# Patient Record
Sex: FEMALE | Race: WHITE | NOT HISPANIC OR LATINO | Employment: FULL TIME | URBAN - METROPOLITAN AREA
[De-identification: names, ages, dates, MRNs, and addresses within clinical notes are randomized per-mention and may not be internally consistent; named-entity substitution may affect disease eponyms.]

---

## 2017-06-01 ENCOUNTER — TRANSCRIBE ORDERS (OUTPATIENT)
Dept: ADMINISTRATIVE | Facility: HOSPITAL | Age: 52
End: 2017-06-01

## 2017-06-01 DIAGNOSIS — N63.20 LEFT BREAST MASS: Primary | ICD-10-CM

## 2017-06-08 ENCOUNTER — HOSPITAL ENCOUNTER (OUTPATIENT)
Dept: ULTRASOUND IMAGING | Facility: HOSPITAL | Age: 52
Discharge: HOME | End: 2017-06-08

## 2017-06-08 ENCOUNTER — HOSPITAL ENCOUNTER (OUTPATIENT)
Dept: MAMMOGRAPHY | Facility: HOSPITAL | Age: 52
Discharge: HOME | End: 2017-06-08
Admitting: NURSE PRACTITIONER

## 2017-06-08 DIAGNOSIS — N63.20 LEFT BREAST MASS: ICD-10-CM

## 2017-06-08 PROCEDURE — 76641 ULTRASOUND BREAST COMPLETE: CPT

## 2017-06-08 PROCEDURE — G0279 TOMOSYNTHESIS, MAMMO: HCPCS

## 2017-06-08 PROCEDURE — G0204 DX MAMMO INCL CAD BI: HCPCS

## 2020-02-10 ENCOUNTER — TRANSCRIBE ORDERS (OUTPATIENT)
Dept: ADMINISTRATIVE | Facility: HOSPITAL | Age: 55
End: 2020-02-10

## 2020-02-10 DIAGNOSIS — Z12.31 SCREENING MAMMOGRAM, ENCOUNTER FOR: Primary | ICD-10-CM

## 2020-02-26 ENCOUNTER — APPOINTMENT (OUTPATIENT)
Dept: MAMMOGRAPHY | Facility: HOSPITAL | Age: 55
End: 2020-02-26

## 2020-02-26 ENCOUNTER — HOSPITAL ENCOUNTER (OUTPATIENT)
Dept: MAMMOGRAPHY | Facility: HOSPITAL | Age: 55
Discharge: HOME | End: 2020-02-26
Admitting: NURSE PRACTITIONER

## 2020-02-26 DIAGNOSIS — Z12.31 SCREENING MAMMOGRAM, ENCOUNTER FOR: ICD-10-CM

## 2020-02-26 PROCEDURE — 77067 SCR MAMMO BI INCL CAD: CPT

## 2020-02-26 PROCEDURE — 77063 BREAST TOMOSYNTHESIS BI: CPT

## 2021-07-13 ENCOUNTER — TRANSCRIBE ORDERS (OUTPATIENT)
Dept: ADMINISTRATIVE | Facility: HOSPITAL | Age: 56
End: 2021-07-13

## 2021-07-13 DIAGNOSIS — Z92.89 HISTORY OF MAMMOGRAPHY, SCREENING: Primary | ICD-10-CM

## 2021-08-04 ENCOUNTER — HOSPITAL ENCOUNTER (OUTPATIENT)
Dept: MAMMOGRAPHY | Facility: HOSPITAL | Age: 56
End: 2021-08-04

## 2021-09-13 ENCOUNTER — HOSPITAL ENCOUNTER (OUTPATIENT)
Dept: MAMMOGRAPHY | Facility: HOSPITAL | Age: 56
Discharge: HOME | End: 2021-09-13
Admitting: NURSE PRACTITIONER

## 2021-09-13 DIAGNOSIS — Z92.89 HISTORY OF MAMMOGRAPHY, SCREENING: ICD-10-CM

## 2021-09-13 PROCEDURE — 77063 BREAST TOMOSYNTHESIS BI: CPT

## 2021-09-13 PROCEDURE — 77067 SCR MAMMO BI INCL CAD: CPT

## 2022-01-01 ENCOUNTER — HOSPITAL ENCOUNTER (EMERGENCY)
Facility: HOSPITAL | Age: 57
Discharge: HOME | End: 2022-01-01
Attending: EMERGENCY MEDICINE | Admitting: EMERGENCY MEDICINE

## 2022-01-01 VITALS
HEIGHT: 63 IN | SYSTOLIC BLOOD PRESSURE: 146 MMHG | RESPIRATION RATE: 18 BRPM | HEART RATE: 76 BPM | TEMPERATURE: 99 F | BODY MASS INDEX: 34.55 KG/M2 | OXYGEN SATURATION: 98 % | WEIGHT: 195 LBS | DIASTOLIC BLOOD PRESSURE: 96 MMHG

## 2022-01-01 DIAGNOSIS — B02.9 HERPES ZOSTER WITHOUT COMPLICATION: Primary | ICD-10-CM

## 2022-01-01 PROCEDURE — 99283 EMERGENCY DEPT VISIT LOW MDM: CPT

## 2022-01-01 PROCEDURE — 99282 EMERGENCY DEPT VISIT SF MDM: CPT

## 2022-01-01 RX ORDER — NAPROXEN 500 MG/1
500 TABLET ORAL 2 TIMES DAILY PRN
Qty: 10 TABLET | Refills: 0 | Status: SHIPPED | OUTPATIENT
Start: 2022-01-01 | End: 2022-01-06

## 2022-01-01 RX ORDER — ACYCLOVIR 200 MG/1
200 CAPSULE ORAL ONCE
Status: COMPLETED | OUTPATIENT
Start: 2022-01-01 | End: 2022-01-01

## 2022-01-01 RX ORDER — LOSARTAN POTASSIUM 25 MG/1
25 TABLET ORAL DAILY
COMMUNITY
End: 2022-07-27

## 2022-01-01 RX ORDER — ACYCLOVIR 400 MG/1
400 TABLET ORAL
Qty: 50 TABLET | Refills: 0 | Status: SHIPPED | OUTPATIENT
Start: 2022-01-01 | End: 2022-01-11

## 2022-01-01 RX ADMIN — ACYCLOVIR 200 MG: 200 CAPSULE ORAL at 13:32

## 2022-01-01 NOTE — ED PROVIDER NOTES
" EMERGENCY DEPARTMENT ENCOUNTER      Room Number: 06/06    History is provided by the patient, no translation services needed    HPI:    Chief complaint: Rash    Location: Right foot    Quality/Severity: Patient describes the pain as a burning pain.  She rates the pain a 7 out of 10.    Timing/Duration: 1 day    Modifying Factors: Touching the rash makes the pain worse.    Associated Symptoms: See narrative    Narrative: Pt is a 56 y.o. female who presents complaining of a rash on her right foot x1 day.  Patient states that she has pain that she describes as a burning pain with the rash.  She rates the pain a 7 out of 10.  Patient advises that touching the rash makes the pain worse.  Patient advises that about 6 days ago she began having lower back pain with sciatica-like symptoms.  She has been seeing a chiropractor for the symptoms.  She advises that last night she noticed a \"blister\" on her foot and this morning the rash had spread.  She called her chiropractor and discussed the new rash.  Her chiropractor advised her to go to the ED to be evaluated for shingles.  Patient denies any pain in the left leg or foot.  Patient advises that she can walk but sometimes the pain is exacerbated.  Patient denies any nausea, vomiting, diarrhea, abdominal pain, chest pain, shortness of breath, or headaches.      PMD: Gracie Walker APRN    REVIEW OF SYSTEMS  Review of Systems   Constitutional: Negative for chills and fever.   HENT: Negative for congestion and rhinorrhea.    Eyes: Negative for pain and visual disturbance.   Respiratory: Negative for cough, chest tightness and shortness of breath.    Cardiovascular: Negative for chest pain and leg swelling.   Gastrointestinal: Negative for abdominal pain, constipation, diarrhea, nausea and vomiting.   Genitourinary: Negative for dysuria and flank pain.   Musculoskeletal: Positive for back pain. Negative for arthralgias, myalgias and neck pain.   Skin: Positive for rash. " Negative for color change, pallor and wound.   Neurological: Negative for dizziness, syncope, weakness and headaches.   Psychiatric/Behavioral: Negative for suicidal ideas. The patient is not nervous/anxious.          PAST MEDICAL HISTORY  Active Ambulatory Problems     Diagnosis Date Noted   • No Active Ambulatory Problems     Resolved Ambulatory Problems     Diagnosis Date Noted   • No Resolved Ambulatory Problems     Past Medical History:   Diagnosis Date   • Asthma    • Chronic constipation    • Chronic fatigue    • IBS (irritable bowel syndrome)    • Rectal bleed        PAST SURGICAL HISTORY  Past Surgical History:   Procedure Laterality Date   • ANKLE SURGERY     •  SECTION     • HERNIA REPAIR     • HYSTERECTOMY     • KIDNEY SURGERY     • SHOULDER SURGERY         FAMILY HISTORY  Family History   Problem Relation Age of Onset   • Hypertension Mother    • Breast cancer Mother    • Diabetes Father    • Heart disease Father    • Hypertension Father    • Hypertension Other        SOCIAL HISTORY  Social History     Socioeconomic History   • Marital status:    Tobacco Use   • Smoking status: Former Smoker   Substance and Sexual Activity   • Alcohol use: Yes   • Drug use: No       ALLERGIES  Patient has no known allergies.      Current Facility-Administered Medications:   •  acyclovir (ZOVIRAX) capsule 200 mg, 200 mg, Oral, Once, Cass Mitchell PA-C    Current Outpatient Medications:   •  Linaclotide (LINZESS PO), Take by mouth., Disp: , Rfl:   •  losartan (COZAAR) 25 MG tablet, Take 25 mg by mouth Daily., Disp: , Rfl:   •  Zolpidem Tartrate (AMBIEN PO), Take by mouth., Disp: , Rfl:   •  acyclovir (ZOVIRAX) 400 MG tablet, Take 1 tablet by mouth 5 (Five) Times a Day for 10 days. Take no more than 5 doses a day., Disp: 50 tablet, Rfl: 0  •  naproxen (EC NAPROSYN) 500 MG EC tablet, Take 1 tablet by mouth 2 (Two) Times a Day As Needed for Mild Pain  for up to 5 days., Disp: 10 tablet, Rfl: 0    PHYSICAL  EXAM  ED Triage Vitals   Temp Heart Rate Resp BP SpO2   01/01/22 1256 01/01/22 1254 01/01/22 1254 01/01/22 1254 01/01/22 1254   99 °F (37.2 °C) 76 18 146/96 98 %      Temp src Heart Rate Source Patient Position BP Location FiO2 (%)   01/01/22 1256 -- 01/01/22 1254 01/01/22 1254 --   Oral  Sitting Right arm        Physical Exam  Vitals and nursing note reviewed.   Constitutional:       General: She is not in acute distress.     Appearance: Normal appearance. She is not ill-appearing, toxic-appearing or diaphoretic.   HENT:      Head: Normocephalic and atraumatic.   Eyes:      Conjunctiva/sclera: Conjunctivae normal.   Cardiovascular:      Rate and Rhythm: Normal rate and regular rhythm.      Pulses: Normal pulses.      Heart sounds: Normal heart sounds.   Pulmonary:      Effort: Pulmonary effort is normal. No respiratory distress.      Breath sounds: Normal breath sounds. No wheezing, rhonchi or rales.   Abdominal:      General: Bowel sounds are normal. There is no distension.      Palpations: Abdomen is soft.      Tenderness: There is no abdominal tenderness. There is no guarding or rebound.   Musculoskeletal:         General: No swelling, tenderness, deformity or signs of injury. Normal range of motion.      Cervical back: Normal range of motion and neck supple.      Right lower leg: No edema.      Left lower leg: No edema.   Skin:     General: Skin is warm and dry.      Coloration: Skin is not jaundiced or pale.      Findings: Rash (on right foot) present. No bruising or erythema.   Neurological:      Mental Status: She is alert and oriented to person, place, and time.   Psychiatric:         Mood and Affect: Mood and affect normal.           LAB RESULTS  Lab Results (last 24 hours)     ** No results found for the last 24 hours. **            RADIOLOGY  No Radiology Exams Resulted Within Past 24 Hours        PROCEDURES  Procedures      PROGRESS AND CONSULTS  ED Course as of 01/01/22 1328   Sat Jan 01, 2022   1329  Had Dr. More evaluate the pt as well. He advised to treat for shingles.  [AH]      ED Course User Index  [AH] Cass Mitchell PA-C           MEDICAL DECISION MAKING    MDM     My diagnosis for lower extremity pain and injury includes but is not limited to hip fracture, femur fracture, hip dislocation, hip contusion, hip sprain, hip strain, pelvic fracture, knee sprain, patella dislocation, knee dislocation, internal derangement of knee, fractures of the femur, tibia, fibula, ankle, foot and digits, ankle sprain, ankle dislocation, Lisfranc fracture, fracture dislocations of the digits, pulmonary embolism, claudication, peripheral vascular disease, gout, osteoarthritis, rheumatoid arthritis, bursitis, septic joint, poly-rheumatica, polyarthralgia and other inflammatory or infectious disease processes.  DIAGNOSIS  Final diagnoses:   Herpes zoster without complication       Latest Documented Vital Signs:  As of 13:28 EST  BP- 146/96 HR- 76 Temp- 99 °F (37.2 °C) (Oral) O2 sat- 98%    DISPOSITION  Pt discharged    Discussed pertinent findings with the patient.  Patient voiced understanding of need to follow-up for recheck and further testing as needed.  Return to the Emergency Department warnings were given.         Medication List      New Prescriptions    acyclovir 400 MG tablet  Commonly known as: ZOVIRAX  Take 1 tablet by mouth 5 (Five) Times a Day for 10 days. Take no more than 5 doses a day.     naproxen 500 MG EC tablet  Commonly known as: EC NAPROSYN  Take 1 tablet by mouth 2 (Two) Times a Day As Needed for Mild Pain  for up to 5 days.           Where to Get Your Medications      These medications were sent to [place] [address] AT [facility]  &  - [phone] [facility] [phone] [address] [address]    Phone: [phone]   · acyclovir 400 MG tablet  · naproxen 500 MG EC tablet              Follow-up Information     Gracie Walker, APRN. Call  in 2 days.    Specialty: Family Medicine  Why: to schedule follow up  Contact information:  [address] [place] [address] [phone]                           Dictated utilizing Dragon dictation     Cass Mitchell PA-C  01/01/22 3066

## 2022-01-01 NOTE — DISCHARGE INSTRUCTIONS
You have shingles.  This is a recurrence of the chickenpox virus.  Medication as directed.  Avoid skin to skin contact with other people until the rash has resolved.  Follow-up with your PCP as above.  Return to ED for worsening symptoms, medical emergencies.

## 2022-07-27 ENCOUNTER — OFFICE VISIT (OUTPATIENT)
Dept: SURGERY | Facility: CLINIC | Age: 57
End: 2022-07-27

## 2022-07-27 VITALS
HEIGHT: 63 IN | DIASTOLIC BLOOD PRESSURE: 76 MMHG | RESPIRATION RATE: 16 BRPM | HEART RATE: 80 BPM | SYSTOLIC BLOOD PRESSURE: 138 MMHG | WEIGHT: 195 LBS | BODY MASS INDEX: 34.55 KG/M2

## 2022-07-27 DIAGNOSIS — K80.20 GALLSTONES: Primary | ICD-10-CM

## 2022-07-27 PROCEDURE — 99204 OFFICE O/P NEW MOD 45 MIN: CPT | Performed by: SURGERY

## 2022-07-27 NOTE — H&P
NATY Schaeffer 57 y.o. female presents @ the req of SHANA Monge for eval of gallstones.  Pt reports + epi pain, bloating, and nausea.   Chief Complaint   Patient presents with   • Cholelithiasis             HPI   Above-noted agree.  This very pleasant 57-year-old female has been having epigastric pain for the past several months.  She had an ultrasound that showed gallstones.  She has a lot of bloating and nausea as well.  She has no fevers or chills.  She has no chest pain or shortness of breath.  She does not smoke or use tobacco products.      Review of Systems   All other systems reviewed and are negative.            Current Outpatient Medications:   •  albuterol sulfate  (90 Base) MCG/ACT inhaler, Inhale 2 puffs Every 4 (Four) Hours As Needed for Wheezing., Disp: , Rfl:   •  amLODIPine (NORVASC) 5 MG tablet, Take 5 mg by mouth Daily., Disp: , Rfl:   •  ASCORBIC ACID PO, Take  by mouth., Disp: , Rfl:   •  BIOTIN PO, Take  by mouth., Disp: , Rfl:   •  esomeprazole (nexIUM) 20 MG capsule, Take 20 mg by mouth Every Morning Before Breakfast., Disp: , Rfl:   •  folic acid (FOLVITE) 1 MG tablet, Take 1 mg by mouth Daily., Disp: , Rfl:   •  hydroCHLOROthiazide (MICROZIDE) 12.5 MG capsule, Take 12.5 mg by mouth Daily., Disp: , Rfl:   •  Linaclotide (LINZESS PO), Take by mouth., Disp: , Rfl:   •  olmesartan-hydrochlorothiazide (BENICAR HCT) 40-25 MG per tablet, Take 1 tablet by mouth Daily., Disp: , Rfl:   •  Probiotic Product (PROBIOTIC ADVANCED PO), Take  by mouth., Disp: , Rfl:   •  rosuvastatin (CRESTOR) 5 MG tablet, Take 5 mg by mouth Daily., Disp: , Rfl:   •  vitamin D (ERGOCALCIFEROL) 1.25 MG (17291 UT) capsule capsule, Take 50,000 Units by mouth 1 (One) Time Per Week., Disp: , Rfl:   •  Zolpidem Tartrate (AMBIEN PO), Take by mouth., Disp: , Rfl:         No Known Allergies        Past Medical History:   Diagnosis Date   • Asthma    • Chronic constipation    • Chronic fatigue    • IBS (irritable bowel  "syndrome)    • Rectal bleed            Past Surgical History:   Procedure Laterality Date   • ANKLE SURGERY     •  SECTION     • HERNIA REPAIR     • HYSTERECTOMY     • KIDNEY SURGERY     • SHOULDER SURGERY             Social History     Tobacco Use   • Smoking status: Former Smoker   Substance Use Topics   • Alcohol use: Yes   • Drug use: No             There is no immunization history on file for this patient.        Physical Exam  Vitals and nursing note reviewed.   Constitutional:       Appearance: Normal appearance.   HENT:      Head: Normocephalic and atraumatic.   Cardiovascular:      Rate and Rhythm: Normal rate and regular rhythm.   Pulmonary:      Effort: Pulmonary effort is normal.      Breath sounds: Normal breath sounds.   Abdominal:      General: Bowel sounds are normal.      Palpations: Abdomen is soft.   Musculoskeletal:         General: No swelling or tenderness.   Skin:     General: Skin is warm and dry.   Neurological:      General: No focal deficit present.      Mental Status: She is alert and oriented to person, place, and time.   Psychiatric:         Mood and Affect: Mood normal.         Behavior: Behavior normal.         Debilities/Disabilities Identified: None    Emotional Behavior: Appropriate      /76   Pulse 80   Resp 16   Ht 160 cm (63\")   Wt 88.5 kg (195 lb)   BMI 34.54 kg/m²         Diagnoses and all orders for this visit:    1. Gallstones (Primary)    I discussed with the patient the benefits and risks of performing a laparoscopic cholecystectomy with intraoperative cholangiogram possible open procedure.  Benefits and risks not limited to but including: Bleeding, infection, hernia formation, having to convert to an open procedure, injury to intra-abdominal structures, intra-abdominal abscess, intra-abdominal biloma, bile leak, DVT, PE, atelectasis, pneumonia, anesthetic complications.  The patient appeared to understand and is willing to proceed.    Thank you for " allowing me to participate in the care of this interesting patient.

## 2022-07-27 NOTE — PROGRESS NOTES
NATY Schaeffer 57 y.o. female presents @ the req of SHANA Monge for eval of gallstones.  Pt reports + epi pain, bloating, and nausea.   Chief Complaint   Patient presents with   • Cholelithiasis             HPI   Above-noted agree.  This very pleasant 57-year-old female has been having epigastric pain for the past several months.  She had an ultrasound that showed gallstones.  She has a lot of bloating and nausea as well.  She has no fevers or chills.  She has no chest pain or shortness of breath.  She does not smoke or use tobacco products.      Review of Systems   All other systems reviewed and are negative.            Current Outpatient Medications:   •  albuterol sulfate  (90 Base) MCG/ACT inhaler, Inhale 2 puffs Every 4 (Four) Hours As Needed for Wheezing., Disp: , Rfl:   •  amLODIPine (NORVASC) 5 MG tablet, Take 5 mg by mouth Daily., Disp: , Rfl:   •  ASCORBIC ACID PO, Take  by mouth., Disp: , Rfl:   •  BIOTIN PO, Take  by mouth., Disp: , Rfl:   •  esomeprazole (nexIUM) 20 MG capsule, Take 20 mg by mouth Every Morning Before Breakfast., Disp: , Rfl:   •  folic acid (FOLVITE) 1 MG tablet, Take 1 mg by mouth Daily., Disp: , Rfl:   •  hydroCHLOROthiazide (MICROZIDE) 12.5 MG capsule, Take 12.5 mg by mouth Daily., Disp: , Rfl:   •  Linaclotide (LINZESS PO), Take by mouth., Disp: , Rfl:   •  olmesartan-hydrochlorothiazide (BENICAR HCT) 40-25 MG per tablet, Take 1 tablet by mouth Daily., Disp: , Rfl:   •  Probiotic Product (PROBIOTIC ADVANCED PO), Take  by mouth., Disp: , Rfl:   •  rosuvastatin (CRESTOR) 5 MG tablet, Take 5 mg by mouth Daily., Disp: , Rfl:   •  vitamin D (ERGOCALCIFEROL) 1.25 MG (82767 UT) capsule capsule, Take 50,000 Units by mouth 1 (One) Time Per Week., Disp: , Rfl:   •  Zolpidem Tartrate (AMBIEN PO), Take by mouth., Disp: , Rfl:         No Known Allergies        Past Medical History:   Diagnosis Date   • Asthma    • Chronic constipation    • Chronic fatigue    • IBS (irritable bowel  "syndrome)    • Rectal bleed            Past Surgical History:   Procedure Laterality Date   • ANKLE SURGERY     •  SECTION     • HERNIA REPAIR     • HYSTERECTOMY     • KIDNEY SURGERY     • SHOULDER SURGERY             Social History     Tobacco Use   • Smoking status: Former Smoker   Substance Use Topics   • Alcohol use: Yes   • Drug use: No             There is no immunization history on file for this patient.        Physical Exam  Vitals and nursing note reviewed.   Constitutional:       Appearance: Normal appearance.   HENT:      Head: Normocephalic and atraumatic.   Cardiovascular:      Rate and Rhythm: Normal rate and regular rhythm.   Pulmonary:      Effort: Pulmonary effort is normal.      Breath sounds: Normal breath sounds.   Abdominal:      General: Bowel sounds are normal.      Palpations: Abdomen is soft.   Musculoskeletal:         General: No swelling or tenderness.   Skin:     General: Skin is warm and dry.   Neurological:      General: No focal deficit present.      Mental Status: She is alert and oriented to person, place, and time.   Psychiatric:         Mood and Affect: Mood normal.         Behavior: Behavior normal.         Debilities/Disabilities Identified: None    Emotional Behavior: Appropriate      /76   Pulse 80   Resp 16   Ht 160 cm (63\")   Wt 88.5 kg (195 lb)   BMI 34.54 kg/m²         Diagnoses and all orders for this visit:    1. Gallstones (Primary)    I discussed with the patient the benefits and risks of performing a laparoscopic cholecystectomy with intraoperative cholangiogram possible open procedure.  Benefits and risks not limited to but including: Bleeding, infection, hernia formation, having to convert to an open procedure, injury to intra-abdominal structures, intra-abdominal abscess, intra-abdominal biloma, bile leak, DVT, PE, atelectasis, pneumonia, anesthetic complications.  The patient appeared to understand and is willing to proceed.    Thank you for " allowing me to participate in the care of this interesting patient.

## 2022-07-29 ENCOUNTER — TELEPHONE (OUTPATIENT)
Dept: SURGERY | Facility: CLINIC | Age: 57
End: 2022-07-29

## 2022-07-29 ENCOUNTER — PATIENT ROUNDING (BHMG ONLY) (OUTPATIENT)
Dept: SURGERY | Facility: CLINIC | Age: 57
End: 2022-07-29

## 2022-07-29 ENCOUNTER — ANESTHESIA EVENT (OUTPATIENT)
Dept: PERIOP | Facility: HOSPITAL | Age: 57
End: 2022-07-29

## 2022-07-29 NOTE — TELEPHONE ENCOUNTER
Caller: NATY Schaeffer    Relationship: SELF     Best call back number: [phone]    What form or medical record are you requesting: SOMETHING IN GENERAL REQUESTING NEW DATE OF SURGERY     Who is requesting this form or medical record from you: EMPLOYER     How would you like to receive the form or medical records (pick-up, mail, fax):FAX  If fax, what is the fax number: [phone]      Timeframe paperwork needed: ASAP    Additional notes:PT WOULD LIKE PAPER WORK FAXED OVER ON NEW SURGERY DATES

## 2022-07-29 NOTE — PROGRESS NOTES
July 29, 2022    Hello, may I speak with NATY Schaeffer?    My name is Salima Hanley      I am  with [facility]  [address] [place] [address] [phone].    Before we get started may I verify your date of birth? 1965    I am calling to officially welcome you to our practice and ask about your recent visit. Is this a good time to talk? yes    Tell me about your visit with us. What things went well?  Visit was great. Everyone was very nice.       We're always looking for ways to make our patients' experiences even better. Do you have recommendations on ways we may improve?  no    Overall were you satisfied with your first visit to our practice? yes       I appreciate you taking the time to speak with me today. Is there anything else I can do for you? no      Thank you, and have a great day.

## 2022-08-02 ENCOUNTER — PRE-ADMISSION TESTING (OUTPATIENT)
Dept: PREADMISSION TESTING | Facility: HOSPITAL | Age: 57
End: 2022-08-02

## 2022-08-02 VITALS
HEART RATE: 75 BPM | HEIGHT: 63 IN | DIASTOLIC BLOOD PRESSURE: 66 MMHG | WEIGHT: 206.4 LBS | OXYGEN SATURATION: 99 % | BODY MASS INDEX: 36.57 KG/M2 | RESPIRATION RATE: 16 BRPM | SYSTOLIC BLOOD PRESSURE: 117 MMHG

## 2022-08-02 LAB — QT INTERVAL: 394 MS

## 2022-08-02 PROCEDURE — 93005 ELECTROCARDIOGRAM TRACING: CPT

## 2022-08-02 PROCEDURE — 93010 ELECTROCARDIOGRAM REPORT: CPT | Performed by: INTERNAL MEDICINE

## 2022-08-02 NOTE — DISCHARGE INSTRUCTIONS
PRE-ADMISSION TESTING INSTRUCTIONS FOR ADULTS    Take these medications the morning of surgery with a small sip of water:  nexium      Do not take any insulin or diabetes medications the morning of surgery.      No aspirin, advil, aleve, ibuprofen, naproxen, diet pills, decongestants, or herbal/vitamins for a week prior to surgery.    General Instructions:    DO NOT EAT SOLID FOOD OR DRINK LIQUIDS AFTER MIDNIGHT THE NIGHT BEFORE SURGERY. No gum, mints, or hard candy after midnight the night before surgery.      Patients who avoid smoking, chewing tobacco and alcohol for 4 weeks prior to surgery have a reduced risk of post-operative complications.  If at all possible, quit smoking as many days before surgery as you can.    Do not smoke, use chewing tobacco or drink alcohol the day of surgery    Bring your C-PAP/ BI-PAP machine if you use one.  Wear clean comfortable clothes and socks.  Do not wear contact lenses, lotion, deodorant, or make-up.  Bring a case for your glasses if applicable. You may brush your teeth the morning of surgery.  You may wear dentures/partials, do not put adhesive/glue on them.  Leave all other jewelry and valuables at home.      Preventing a Surgical Site Infection:    Shower the night before and on the morning of surgery using the chlorhexidine soap you were given.  Use a clean washcloth with the soap.  Place clean sheets on your bed after showering the night before surgery. Do not use the CHG soap on your hair, face, or private areas. Wash your body gently for five (5) minutes. Do not scrub your skin.  Dry with a clean towel and dress in clean clothing.  Do not shave the surgical area for 10 days-2 weeks prior to surgery  because the razor can irritate skin and make it easier to develop an infection.  Make sure you, your family, and all healthcare providers clean their hands with soap and water or an alcohol based hand  before caring for you or your wound.      Day of  surgery:    Your surgeon’s office will advise you of your arrival time for the day of surgery.    Upon arrival, a Pre-op nurse and Anesthesia provider will review your health history, obtain vital signs, and answer questions you may have.  The only belongings needed at this time will be your home medications and if applicable your C-PAP/BI-PAP machine.  If you are staying overnight your family can leave the rest of your belongings in the car and bring them to your room later.  A Pre-op nurse will start an IV and you may receive medication in preparation for surgery, including something to help you relax.  Your family will be able to see you in the Pre-op area.  While you are in surgery your family should notify the waiting room  if they leave the waiting room area and provide a contact phone number.    IF you have any questions, you can call the Pre-Admission Department at [phone] or your surgeon's office.  Notify your surgeon if  you become sick, have a fever, productive cough, or cannot be here the day of surgery    Please be aware that surgery does come with discomfort.  We want to make every effort to control your discomfort so please discuss any uncontrolled symptoms with your nurse.   Your doctor will most likely have prescribed pain medications.      If you are going home after surgery, you will receive individualized written care instructions before being discharged.  A responsible adult (over the age of 18) must drive you to and from the hospital on the day of your surgery and stay with you for 24 hours after anesthesia.    If you are staying overnight following surgery, you will be transported to your hospital room following the recovery period.  [facility] has all private rooms.    You may receive a survey regarding the care you received. Your feedback is very important and will be used to collect the necessary data to help us to continue to provide excellent care.      Deductibles and co-payments are collected on the day of service. Please be prepared to pay the required co-pay, deductible or deposit on the day of service as defined by your plan.

## 2022-08-02 NOTE — PAT
Pt here for PAT visit.  Pre-op tests completed, chg soap given, and instructions reviewed.  Instructed clears until npo after midnight night prior, voiced understanding. Pt had a positive COVID test on 6/6/22 at her work.

## 2022-08-04 ENCOUNTER — ANESTHESIA (OUTPATIENT)
Dept: PERIOP | Facility: HOSPITAL | Age: 57
End: 2022-08-04

## 2022-08-04 ENCOUNTER — APPOINTMENT (OUTPATIENT)
Dept: GENERAL RADIOLOGY | Facility: HOSPITAL | Age: 57
End: 2022-08-04

## 2022-08-04 ENCOUNTER — HOSPITAL ENCOUNTER (OUTPATIENT)
Facility: HOSPITAL | Age: 57
Setting detail: HOSPITAL OUTPATIENT SURGERY
Discharge: HOME | End: 2022-08-04
Attending: SURGERY | Admitting: SURGERY

## 2022-08-04 VITALS
DIASTOLIC BLOOD PRESSURE: 62 MMHG | HEART RATE: 70 BPM | TEMPERATURE: 97.6 F | OXYGEN SATURATION: 98 % | WEIGHT: 204.2 LBS | HEIGHT: 63 IN | RESPIRATION RATE: 16 BRPM | BODY MASS INDEX: 36.18 KG/M2 | SYSTOLIC BLOOD PRESSURE: 112 MMHG

## 2022-08-04 DIAGNOSIS — K80.20 GALLSTONES: ICD-10-CM

## 2022-08-04 LAB
ALBUMIN SERPL-MCNC: 4.3 G/DL (ref 3.5–5.2)
ALBUMIN/GLOB SERPL: 1.5 G/DL
ALP SERPL-CCNC: 85 U/L (ref 39–117)
ALT SERPL W P-5'-P-CCNC: 22 U/L (ref 1–33)
ANION GAP SERPL CALCULATED.3IONS-SCNC: 13.5 MMOL/L (ref 5–15)
AST SERPL-CCNC: 19 U/L (ref 1–32)
BASOPHILS # BLD AUTO: 0.08 10*3/MM3 (ref 0–0.2)
BASOPHILS NFR BLD AUTO: 1.1 % (ref 0–1.5)
BILIRUB SERPL-MCNC: 0.2 MG/DL (ref 0–1.2)
BUN SERPL-MCNC: 25 MG/DL (ref 6–20)
BUN/CREAT SERPL: 25.8 (ref 7–25)
CALCIUM SPEC-SCNC: 9.5 MG/DL (ref 8.6–10.5)
CHLORIDE SERPL-SCNC: 104 MMOL/L (ref 98–107)
CO2 SERPL-SCNC: 21.5 MMOL/L (ref 22–29)
CREAT SERPL-MCNC: 0.97 MG/DL (ref 0.57–1)
DEPRECATED RDW RBC AUTO: 39.8 FL (ref 37–54)
EGFRCR SERPLBLD CKD-EPI 2021: 68.3 ML/MIN/1.73
EOSINOPHIL # BLD AUTO: 0.23 10*3/MM3 (ref 0–0.4)
EOSINOPHIL NFR BLD AUTO: 3.2 % (ref 0.3–6.2)
ERYTHROCYTE [DISTWIDTH] IN BLOOD BY AUTOMATED COUNT: 13 % (ref 12.3–15.4)
GLOBULIN UR ELPH-MCNC: 2.8 GM/DL
GLUCOSE SERPL-MCNC: 101 MG/DL (ref 65–99)
HCT VFR BLD AUTO: 39.8 % (ref 34–46.6)
HGB BLD-MCNC: 13.5 G/DL (ref 12–15.9)
IMM GRANULOCYTES # BLD AUTO: 0.01 10*3/MM3 (ref 0–0.05)
IMM GRANULOCYTES NFR BLD AUTO: 0.1 % (ref 0–0.5)
LYMPHOCYTES # BLD AUTO: 2.14 10*3/MM3 (ref 0.7–3.1)
LYMPHOCYTES NFR BLD AUTO: 29.6 % (ref 19.6–45.3)
MCH RBC QN AUTO: 28.5 PG (ref 26.6–33)
MCHC RBC AUTO-ENTMCNC: 33.9 G/DL (ref 31.5–35.7)
MCV RBC AUTO: 84.1 FL (ref 79–97)
MONOCYTES # BLD AUTO: 0.57 10*3/MM3 (ref 0.1–0.9)
MONOCYTES NFR BLD AUTO: 7.9 % (ref 5–12)
NEUTROPHILS NFR BLD AUTO: 4.21 10*3/MM3 (ref 1.7–7)
NEUTROPHILS NFR BLD AUTO: 58.1 % (ref 42.7–76)
NRBC BLD AUTO-RTO: 0 /100 WBC (ref 0–0.2)
PLATELET # BLD AUTO: 268 10*3/MM3 (ref 140–450)
PMV BLD AUTO: 11.2 FL (ref 6–12)
POTASSIUM SERPL-SCNC: 4.1 MMOL/L (ref 3.5–5.2)
PROT SERPL-MCNC: 7.1 G/DL (ref 6–8.5)
RBC # BLD AUTO: 4.73 10*6/MM3 (ref 3.77–5.28)
SODIUM SERPL-SCNC: 139 MMOL/L (ref 136–145)
WBC NRBC COR # BLD: 7.24 10*3/MM3 (ref 3.4–10.8)

## 2022-08-04 PROCEDURE — 25010000002 PROPOFOL 10 MG/ML EMULSION: Performed by: REGISTERED NURSE

## 2022-08-04 PROCEDURE — 47563 LAPARO CHOLECYSTECTOMY/GRAPH: CPT | Performed by: SPECIALIST/TECHNOLOGIST, OTHER

## 2022-08-04 PROCEDURE — 25010000002 FENTANYL CITRATE (PF) 50 MCG/ML SOLUTION: Performed by: REGISTERED NURSE

## 2022-08-04 PROCEDURE — 85025 COMPLETE CBC W/AUTO DIFF WBC: CPT | Performed by: SURGERY

## 2022-08-04 PROCEDURE — 25010000002 SUCCINYLCHOLINE PER 20 MG: Performed by: REGISTERED NURSE

## 2022-08-04 PROCEDURE — 0 CEFAZOLIN SODIUM-DEXTROSE 2-3 GM-%(50ML) RECONSTITUTED SOLUTION: Performed by: SURGERY

## 2022-08-04 PROCEDURE — 80053 COMPREHEN METABOLIC PANEL: CPT | Performed by: SURGERY

## 2022-08-04 PROCEDURE — 25010000002 MAGNESIUM SULFATE PER 500 MG OF MAGNESIUM: Performed by: REGISTERED NURSE

## 2022-08-04 PROCEDURE — 25010000002 MIDAZOLAM PER 1MG: Performed by: REGISTERED NURSE

## 2022-08-04 PROCEDURE — 74300 X-RAY BILE DUCTS/PANCREAS: CPT

## 2022-08-04 PROCEDURE — 94640 AIRWAY INHALATION TREATMENT: CPT

## 2022-08-04 PROCEDURE — 25010000002 ONDANSETRON PER 1 MG: Performed by: REGISTERED NURSE

## 2022-08-04 PROCEDURE — 25010000002 IOPAMIDOL 61 % SOLUTION: Performed by: SURGERY

## 2022-08-04 PROCEDURE — 47563 LAPARO CHOLECYSTECTOMY/GRAPH: CPT | Performed by: SURGERY

## 2022-08-04 PROCEDURE — C1887 CATHETER, GUIDING: HCPCS | Performed by: SURGERY

## 2022-08-04 PROCEDURE — 94799 UNLISTED PULMONARY SVC/PX: CPT

## 2022-08-04 PROCEDURE — 25010000002 DEXAMETHASONE PER 1 MG: Performed by: REGISTERED NURSE

## 2022-08-04 PROCEDURE — 88304 TISSUE EXAM BY PATHOLOGIST: CPT | Performed by: SURGERY

## 2022-08-04 PROCEDURE — 25010000002 NEOSTIGMINE 10 MG/10ML SOLUTION: Performed by: REGISTERED NURSE

## 2022-08-04 DEVICE — LIGAMAX 5 MM ENDOSCOPIC MULTIPLE CLIP APPLIER
Type: IMPLANTABLE DEVICE | Site: ABDOMEN | Status: FUNCTIONAL
Brand: LIGAMAX

## 2022-08-04 RX ORDER — SODIUM CHLORIDE 0.9 % (FLUSH) 0.9 %
10 SYRINGE (ML) INJECTION EVERY 12 HOURS SCHEDULED
Status: DISCONTINUED | OUTPATIENT
Start: 2022-08-04 | End: 2022-08-04 | Stop reason: HOSPADM

## 2022-08-04 RX ORDER — HYDROCODONE BITARTRATE AND ACETAMINOPHEN 5; 325 MG/1; MG/1
1 TABLET ORAL EVERY 4 HOURS PRN
Qty: 30 TABLET | Refills: 0 | Status: SHIPPED | OUTPATIENT
Start: 2022-08-04

## 2022-08-04 RX ORDER — LIDOCAINE HYDROCHLORIDE 20 MG/ML
INJECTION, SOLUTION INFILTRATION; PERINEURAL AS NEEDED
Status: DISCONTINUED | OUTPATIENT
Start: 2022-08-04 | End: 2022-08-04 | Stop reason: SURG

## 2022-08-04 RX ORDER — FENTANYL CITRATE 50 UG/ML
25 INJECTION, SOLUTION INTRAMUSCULAR; INTRAVENOUS
Status: DISCONTINUED | OUTPATIENT
Start: 2022-08-04 | End: 2022-08-04 | Stop reason: HOSPADM

## 2022-08-04 RX ORDER — PROPOFOL 10 MG/ML
VIAL (ML) INTRAVENOUS AS NEEDED
Status: DISCONTINUED | OUTPATIENT
Start: 2022-08-04 | End: 2022-08-04 | Stop reason: SURG

## 2022-08-04 RX ORDER — SUCCINYLCHOLINE CHLORIDE 20 MG/ML
INJECTION INTRAMUSCULAR; INTRAVENOUS AS NEEDED
Status: DISCONTINUED | OUTPATIENT
Start: 2022-08-04 | End: 2022-08-04 | Stop reason: SURG

## 2022-08-04 RX ORDER — LIDOCAINE HYDROCHLORIDE 10 MG/ML
0.5 INJECTION, SOLUTION EPIDURAL; INFILTRATION; INTRACAUDAL; PERINEURAL ONCE AS NEEDED
Status: DISCONTINUED | OUTPATIENT
Start: 2022-08-04 | End: 2022-08-04 | Stop reason: HOSPADM

## 2022-08-04 RX ORDER — SODIUM CHLORIDE 0.9 % (FLUSH) 0.9 %
10 SYRINGE (ML) INJECTION AS NEEDED
Status: DISCONTINUED | OUTPATIENT
Start: 2022-08-04 | End: 2022-08-04 | Stop reason: HOSPADM

## 2022-08-04 RX ORDER — IPRATROPIUM BROMIDE AND ALBUTEROL SULFATE 2.5; .5 MG/3ML; MG/3ML
3 SOLUTION RESPIRATORY (INHALATION) ONCE
Status: COMPLETED | OUTPATIENT
Start: 2022-08-04 | End: 2022-08-04

## 2022-08-04 RX ORDER — MAGNESIUM HYDROXIDE 1200 MG/15ML
LIQUID ORAL AS NEEDED
Status: DISCONTINUED | OUTPATIENT
Start: 2022-08-04 | End: 2022-08-04 | Stop reason: HOSPADM

## 2022-08-04 RX ORDER — FAMOTIDINE 10 MG/ML
20 INJECTION, SOLUTION INTRAVENOUS
Status: COMPLETED | OUTPATIENT
Start: 2022-08-04 | End: 2022-08-04

## 2022-08-04 RX ORDER — ROCURONIUM BROMIDE 10 MG/ML
INJECTION, SOLUTION INTRAVENOUS AS NEEDED
Status: DISCONTINUED | OUTPATIENT
Start: 2022-08-04 | End: 2022-08-04 | Stop reason: SURG

## 2022-08-04 RX ORDER — SODIUM CHLORIDE 9 MG/ML
40 INJECTION, SOLUTION INTRAVENOUS AS NEEDED
Status: DISCONTINUED | OUTPATIENT
Start: 2022-08-04 | End: 2022-08-04 | Stop reason: HOSPADM

## 2022-08-04 RX ORDER — NEOSTIGMINE METHYLSULFATE 1 MG/ML
INJECTION, SOLUTION INTRAVENOUS AS NEEDED
Status: DISCONTINUED | OUTPATIENT
Start: 2022-08-04 | End: 2022-08-04 | Stop reason: SURG

## 2022-08-04 RX ORDER — MAGNESIUM SULFATE HEPTAHYDRATE 500 MG/ML
INJECTION, SOLUTION INTRAMUSCULAR; INTRAVENOUS AS NEEDED
Status: DISCONTINUED | OUTPATIENT
Start: 2022-08-04 | End: 2022-08-04 | Stop reason: SURG

## 2022-08-04 RX ORDER — GLYCOPYRROLATE 0.2 MG/ML
INJECTION INTRAMUSCULAR; INTRAVENOUS AS NEEDED
Status: DISCONTINUED | OUTPATIENT
Start: 2022-08-04 | End: 2022-08-04 | Stop reason: SURG

## 2022-08-04 RX ORDER — FENTANYL CITRATE 50 UG/ML
INJECTION, SOLUTION INTRAMUSCULAR; INTRAVENOUS AS NEEDED
Status: DISCONTINUED | OUTPATIENT
Start: 2022-08-04 | End: 2022-08-04 | Stop reason: SURG

## 2022-08-04 RX ORDER — ONDANSETRON 2 MG/ML
4 INJECTION INTRAMUSCULAR; INTRAVENOUS ONCE AS NEEDED
Status: COMPLETED | OUTPATIENT
Start: 2022-08-04 | End: 2022-08-04

## 2022-08-04 RX ORDER — BUPIVACAINE HYDROCHLORIDE 5 MG/ML
INJECTION, SOLUTION EPIDURAL; INTRACAUDAL AS NEEDED
Status: DISCONTINUED | OUTPATIENT
Start: 2022-08-04 | End: 2022-08-04 | Stop reason: HOSPADM

## 2022-08-04 RX ORDER — DEXAMETHASONE SODIUM PHOSPHATE 4 MG/ML
8 INJECTION, SOLUTION INTRA-ARTICULAR; INTRALESIONAL; INTRAMUSCULAR; INTRAVENOUS; SOFT TISSUE ONCE AS NEEDED
Status: COMPLETED | OUTPATIENT
Start: 2022-08-04 | End: 2022-08-04

## 2022-08-04 RX ORDER — DEXMEDETOMIDINE HYDROCHLORIDE 100 UG/ML
INJECTION, SOLUTION INTRAVENOUS AS NEEDED
Status: DISCONTINUED | OUTPATIENT
Start: 2022-08-04 | End: 2022-08-04 | Stop reason: SURG

## 2022-08-04 RX ORDER — SODIUM CHLORIDE, SODIUM LACTATE, POTASSIUM CHLORIDE, CALCIUM CHLORIDE 600; 310; 30; 20 MG/100ML; MG/100ML; MG/100ML; MG/100ML
9 INJECTION, SOLUTION INTRAVENOUS CONTINUOUS
Status: DISCONTINUED | OUTPATIENT
Start: 2022-08-04 | End: 2022-08-04 | Stop reason: HOSPADM

## 2022-08-04 RX ORDER — MIDAZOLAM HYDROCHLORIDE 2 MG/2ML
1 INJECTION, SOLUTION INTRAMUSCULAR; INTRAVENOUS
Status: DISCONTINUED | OUTPATIENT
Start: 2022-08-04 | End: 2022-08-04 | Stop reason: HOSPADM

## 2022-08-04 RX ORDER — LIDOCAINE HYDROCHLORIDE AND EPINEPHRINE 10; 10 MG/ML; UG/ML
INJECTION, SOLUTION INFILTRATION; PERINEURAL AS NEEDED
Status: DISCONTINUED | OUTPATIENT
Start: 2022-08-04 | End: 2022-08-04 | Stop reason: HOSPADM

## 2022-08-04 RX ORDER — CEFAZOLIN SODIUM 2 G/50ML
2 SOLUTION INTRAVENOUS ONCE
Status: COMPLETED | OUTPATIENT
Start: 2022-08-04 | End: 2022-08-04

## 2022-08-04 RX ORDER — KETAMINE HYDROCHLORIDE 10 MG/ML
INJECTION INTRAMUSCULAR; INTRAVENOUS AS NEEDED
Status: DISCONTINUED | OUTPATIENT
Start: 2022-08-04 | End: 2022-08-04 | Stop reason: SURG

## 2022-08-04 RX ORDER — OXYCODONE AND ACETAMINOPHEN 7.5; 325 MG/1; MG/1
1 TABLET ORAL ONCE AS NEEDED
Status: DISCONTINUED | OUTPATIENT
Start: 2022-08-04 | End: 2022-08-04 | Stop reason: HOSPADM

## 2022-08-04 RX ADMIN — DEXAMETHASONE SODIUM PHOSPHATE 8 MG: 4 INJECTION, SOLUTION INTRAMUSCULAR; INTRAVENOUS at 07:07

## 2022-08-04 RX ADMIN — SODIUM CHLORIDE, POTASSIUM CHLORIDE, SODIUM LACTATE AND CALCIUM CHLORIDE 9 ML/HR: 600; 310; 30; 20 INJECTION, SOLUTION INTRAVENOUS at 07:08

## 2022-08-04 RX ADMIN — NEOSTIGMINE METHYLSULFATE 5 MG: 1 INJECTION INTRAVENOUS at 08:15

## 2022-08-04 RX ADMIN — FAMOTIDINE 20 MG: 10 INJECTION, SOLUTION INTRAVENOUS at 07:07

## 2022-08-04 RX ADMIN — KETAMINE HYDROCHLORIDE 10 MG: 10 INJECTION, SOLUTION INTRAMUSCULAR; INTRAVENOUS at 07:45

## 2022-08-04 RX ADMIN — PROPOFOL 200 MG: 10 INJECTION, EMULSION INTRAVENOUS at 07:33

## 2022-08-04 RX ADMIN — IPRATROPIUM BROMIDE AND ALBUTEROL SULFATE 3 ML: 2.5; .5 SOLUTION RESPIRATORY (INHALATION) at 09:14

## 2022-08-04 RX ADMIN — SUGAMMADEX 200 MG: 100 INJECTION, SOLUTION INTRAVENOUS at 08:21

## 2022-08-04 RX ADMIN — LIDOCAINE HYDROCHLORIDE 100 MG: 20 INJECTION, SOLUTION INFILTRATION; PERINEURAL at 07:33

## 2022-08-04 RX ADMIN — ROCURONIUM BROMIDE 5 MG: 10 INJECTION INTRAVENOUS at 07:33

## 2022-08-04 RX ADMIN — GLYCOPYRROLATE 0.4 MG: 0.2 INJECTION INTRAMUSCULAR; INTRAVENOUS at 08:15

## 2022-08-04 RX ADMIN — FENTANYL CITRATE 25 MCG: 50 INJECTION, SOLUTION INTRAMUSCULAR; INTRAVENOUS at 09:28

## 2022-08-04 RX ADMIN — MIDAZOLAM HYDROCHLORIDE 1 MG: 1 INJECTION, SOLUTION INTRAMUSCULAR; INTRAVENOUS at 07:11

## 2022-08-04 RX ADMIN — GLYCOPYRROLATE 0.1 MG: 0.2 INJECTION INTRAMUSCULAR; INTRAVENOUS at 07:45

## 2022-08-04 RX ADMIN — CEFAZOLIN SODIUM 2 G: 2 SOLUTION INTRAVENOUS at 07:39

## 2022-08-04 RX ADMIN — DEXMEDETOMIDINE 4 MCG: 100 INJECTION, SOLUTION, CONCENTRATE INTRAVENOUS at 07:37

## 2022-08-04 RX ADMIN — FENTANYL CITRATE 50 MCG: 50 INJECTION INTRAMUSCULAR; INTRAVENOUS at 08:17

## 2022-08-04 RX ADMIN — KETAMINE HYDROCHLORIDE 20 MG: 10 INJECTION, SOLUTION INTRAMUSCULAR; INTRAVENOUS at 07:33

## 2022-08-04 RX ADMIN — ROCURONIUM BROMIDE 15 MG: 10 INJECTION INTRAVENOUS at 07:45

## 2022-08-04 RX ADMIN — PROPOFOL 100 MG: 10 INJECTION, EMULSION INTRAVENOUS at 07:37

## 2022-08-04 RX ADMIN — FENTANYL CITRATE 50 MCG: 50 INJECTION INTRAMUSCULAR; INTRAVENOUS at 08:21

## 2022-08-04 RX ADMIN — MAGNESIUM SULFATE HEPTAHYDRATE 2 G: 500 INJECTION, SOLUTION INTRAMUSCULAR; INTRAVENOUS at 08:00

## 2022-08-04 RX ADMIN — ROCURONIUM BROMIDE 10 MG: 10 INJECTION INTRAVENOUS at 07:52

## 2022-08-04 RX ADMIN — ONDANSETRON 4 MG: 2 INJECTION INTRAMUSCULAR; INTRAVENOUS at 09:28

## 2022-08-04 RX ADMIN — ONDANSETRON 4 MG: 2 INJECTION INTRAMUSCULAR; INTRAVENOUS at 07:07

## 2022-08-04 RX ADMIN — FENTANYL CITRATE 50 MCG: 50 INJECTION INTRAMUSCULAR; INTRAVENOUS at 07:33

## 2022-08-04 RX ADMIN — SUCCINYLCHOLINE CHLORIDE 160 MG: 20 INJECTION, SOLUTION INTRAMUSCULAR; INTRAVENOUS at 07:37

## 2022-08-04 RX ADMIN — DEXMEDETOMIDINE 12 MCG: 100 INJECTION, SOLUTION, CONCENTRATE INTRAVENOUS at 07:33

## 2022-08-04 NOTE — OP NOTE
PREOPERATIVE DIAGNOSIS: gallstones  POSTOPERATIVE DIAGNOSIS:  gallstones    PROCEDURE: Laparoscopic cholecystectomy with Intraoperative cholangiogram  SURGEON/STAFF: Hugh  ASST: Len Castellano-certified first assistant was necessary for retraction, camera operation, and suturing  SPECIMENS: Gallbladder.  INTRAOPERATIVE COMPLICATIONS: None.  ANESTHESIA: General.  BLOOD LOSS:  5 ml  COUNTS: Needle and sponge counts correct.     Clinical Note: This very pleasant patient presented to my office with the aforementioned complaints.  Benefits and risks of a laparoscopic cholecystectomy with intraoperative cholangiogram possible open procedure were discussed.  Benefits and risks not limited to but including:Bleeding, infection, hernia formation, injury to intra-abdominal structures, bile leak, biloma, intra-abdominal abscess, DVT, PE, atelectasis pneumonia, anesthesia complications. She agreed and was consented for operative management without duress.     PROCEDURE: The patient was brought to the operating room in stable condition. Perioperative antibiotics were given and sequential compression devices applied. She was then laid supine on the operating room table. General anesthesia was induced by the Anesthesia Service without difficulty.     At this time, the patient's abdomen was accessed at the level of the umbilicus in open cutdown technique and insertion of a 12-mm trocar. The abdomen was then insufflated. Brief survey of the abdominal cavity revealed no evidence of injury from insertion of the trocar, or no other intraabdominal pathology. At this time the patient was placed in steep reverse Trendelenburg and a slightly left-side down position. Three additional 5-mm trocars were placed, all in the standard position. This was under direct vision.       The peritoneal attachment of the gallbladder at the level of the infundibulum was scored from laterally to medially, terminating at the level of the hepatic edge. The  peritoneum was gently stripped down, exposing the underlying cystic artery and cystic duct. This was also done on the lateral side of the gallbladder by reflecting the gallbladder medially. The peritoneal attachment here was again gently dissected inferiorly. After completely exposing the cystic duct as well as cystic artery, a retro-cystic window was created. These 2 structures, the cystic duct and cystic artery, were further delineated. A firm critical view was obtained, visualizing healthy-appearing hepatic tissue behind the 2 structures, with no evidence of looping, bridging or tenting of the common bile duct.     A clip was placed distally at the cystic duct and a cystic duct incision with laparoscopic scissors was performed. A percutaneous cholangio-catheter was inserted into the patient abdomen. The catheter was placed and secured into the cystic duct. Cholangiogram was performed that showed normal cystic duct, normal hepatic ducts, normal common bile duct with good spillage of dye into the duodenum with no evidence of any obstructions. The cholangiocatheter was removed and the distal portion of thecystic duct was then clipped twice and ligated.  The cystic artery was then triply clipped and ligated.  It was inspected and seen to be in intact. The gallbladder was then carefully dissected off the hepatic bed using hook electrocautery. It was firmly adherent to the underlying bed, and an effort careful and meticulous hemostasis was undertaken. The gallbladder, after being removed from the hepatic bed, was placed in an EndoCatch bag. It was removed through the umbilical trocar without difficulty.    Next, the umbilical trocar was reinserted into the abdomen and the liver bed was inspected. Hemostasis was perfected. Copious irrigation was carried out. The clips were intact and there was no bleeding or bile leakage noted.  The trocars were then removed under direct vision, air was deflated from the abdomen, and a  Duckworth trocar site fascia was closed with 0 Vicryl suture.  The incisions were then closed with 40 Vicryls suture. Sterile dressings were applied.    Anesthesia was reversed, the ET tube and OG tube were removed.  And the patient was taken into the recovery area in stable postoperative condition having tolerated the procedure well.    CHRISTOPHER Reese DO

## 2022-08-04 NOTE — ANESTHESIA PREPROCEDURE EVALUATION
Anesthesia Evaluation     Patient summary reviewed and Nursing notes reviewed   no history of anesthetic complications:  NPO Solid Status: > 8 hours  NPO Liquid Status: > 8 hours           Airway   Mallampati: I  TM distance: >3 FB  Neck ROM: full  No difficulty expected  Dental - normal exam     Pulmonary - normal exam    breath sounds clear to auscultation  (+) a smoker Former, asthma (hasn't used rescue inhaler in ~1 month),  (-) sleep apnea  Cardiovascular - normal exam  Exercise tolerance: good (4-7 METS)    ECG reviewed  Rhythm: regular  Rate: normal    (+) hypertension well controlled 2 medications or greater, hyperlipidemia,   (-) valvular problems/murmurs (hx of murmur as a child; resolved)    ROS comment: HEART RATE= 74  bpm  RR Interval= 812  ms  MA Interval= 159  ms  P Horizontal Axis= 3  deg  P Front Axis= 57  deg  QRSD Interval= 75  ms  QT Interval= 394  ms  QRS Axis= -23  deg  T Wave Axis= 53  deg  - OTHERWISE NORMAL ECG -  Sinus rhythm  Borderline left axis deviation  NO PRIOR TRACING AVAILABLE FOR COMPARISON  Electronically Signed By: Michael Black ([facility]) 02-Aug-2022 17:56:04  Date and Time of Study: 2022-08-02 14:17:38    Neuro/Psych- negative ROS  GI/Hepatic/Renal/Endo    (+) obesity,  GERD (on nexium) well controlled,  liver disease fatty liver disease, renal disease (stage III; new diagnosis) ESRD,     Musculoskeletal     Abdominal    Substance History - negative use     OB/GYN negative ob/gyn ROS         Other   arthritis (RA; not on any medications for this currently),    history of cancer (skin) remission                    Anesthesia Plan    ASA 2     general     intravenous induction     Anesthetic plan, risks, benefits, and alternatives have been provided, discussed and informed consent has been obtained with: patient.    Use of blood products discussed with patient  Consented to blood products.   Plan discussed with CRNA.        CODE STATUS:

## 2022-08-04 NOTE — H&P
NATY Schaeffer 57 y.o. female presents @ the req of SHANA Monge for eval of gallstones.  Pt reports + epi pain, bloating, and nausea.   Chief Complaint   Patient presents with   • Cholelithiasis             HPI   Above-noted agree.  This very pleasant 57-year-old female has been having epigastric pain for the past several months.  She had an ultrasound that showed gallstones.  She has a lot of bloating and nausea as well.  She has no fevers or chills.  She has no chest pain or shortness of breath.  She does not smoke or use tobacco products.      Review of Systems   All other systems reviewed and are negative.            Current Outpatient Medications:   •  albuterol sulfate  (90 Base) MCG/ACT inhaler, Inhale 2 puffs Every 4 (Four) Hours As Needed for Wheezing., Disp: , Rfl:   •  amLODIPine (NORVASC) 5 MG tablet, Take 5 mg by mouth Daily., Disp: , Rfl:   •  ASCORBIC ACID PO, Take  by mouth., Disp: , Rfl:   •  BIOTIN PO, Take  by mouth., Disp: , Rfl:   •  esomeprazole (nexIUM) 20 MG capsule, Take 20 mg by mouth Every Morning Before Breakfast., Disp: , Rfl:   •  folic acid (FOLVITE) 1 MG tablet, Take 1 mg by mouth Daily., Disp: , Rfl:   •  hydroCHLOROthiazide (MICROZIDE) 12.5 MG capsule, Take 12.5 mg by mouth Daily., Disp: , Rfl:   •  Linaclotide (LINZESS PO), Take by mouth., Disp: , Rfl:   •  olmesartan-hydrochlorothiazide (BENICAR HCT) 40-25 MG per tablet, Take 1 tablet by mouth Daily., Disp: , Rfl:   •  Probiotic Product (PROBIOTIC ADVANCED PO), Take  by mouth., Disp: , Rfl:   •  rosuvastatin (CRESTOR) 5 MG tablet, Take 5 mg by mouth Daily., Disp: , Rfl:   •  vitamin D (ERGOCALCIFEROL) 1.25 MG (23509 UT) capsule capsule, Take 50,000 Units by mouth 1 (One) Time Per Week., Disp: , Rfl:   •  Zolpidem Tartrate (AMBIEN PO), Take by mouth., Disp: , Rfl:         No Known Allergies        Past Medical History:   Diagnosis Date   • Asthma    • Chronic constipation    • Chronic fatigue    • IBS (irritable bowel  "syndrome)    • Rectal bleed            Past Surgical History:   Procedure Laterality Date   • ANKLE SURGERY     •  SECTION     • HERNIA REPAIR     • HYSTERECTOMY     • KIDNEY SURGERY     • SHOULDER SURGERY             Social History     Tobacco Use   • Smoking status: Former Smoker   Substance Use Topics   • Alcohol use: Yes   • Drug use: No             There is no immunization history on file for this patient.        Physical Exam  Vitals and nursing note reviewed.   Constitutional:       Appearance: Normal appearance.   HENT:      Head: Normocephalic and atraumatic.   Cardiovascular:      Rate and Rhythm: Normal rate and regular rhythm.   Pulmonary:      Effort: Pulmonary effort is normal.      Breath sounds: Normal breath sounds.   Abdominal:      General: Bowel sounds are normal.      Palpations: Abdomen is soft.   Musculoskeletal:         General: No swelling or tenderness.   Skin:     General: Skin is warm and dry.   Neurological:      General: No focal deficit present.      Mental Status: She is alert and oriented to person, place, and time.   Psychiatric:         Mood and Affect: Mood normal.         Behavior: Behavior normal.         Debilities/Disabilities Identified: None    Emotional Behavior: Appropriate      /76   Pulse 80   Resp 16   Ht 160 cm (63\")   Wt 88.5 kg (195 lb)   BMI 34.54 kg/m²         Diagnoses and all orders for this visit:    1. Gallstones (Primary)    I discussed with the patient the benefits and risks of performing a laparoscopic cholecystectomy with intraoperative cholangiogram possible open procedure.  Benefits and risks not limited to but including: Bleeding, infection, hernia formation, having to convert to an open procedure, injury to intra-abdominal structures, intra-abdominal abscess, intra-abdominal biloma, bile leak, DVT, PE, atelectasis, pneumonia, anesthetic complications.  The patient appeared to understand and is willing to proceed.    Thank you for " allowing me to participate in the care of this interesting patient.

## 2022-08-04 NOTE — ANESTHESIA POSTPROCEDURE EVALUATION
Patient: Chely KWAN Foutty    Procedure Summary     Date: 08/04/22 Room / Location:  [facility] /  [facility]    Anesthesia Start: 0729 Anesthesia Stop: 0839    Procedure: CHOLECYSTECTOMY LAPAROSCOPIC INTRAOPERATIVE CHOLANGIOGRAM (N/A Abdomen) Diagnosis:       Gallstones      (Gallstones [K80.20])    Surgeons: Penny Reese DO Provider: Sylvester Jewell CRNA    Anesthesia Type: general ASA Status: 2          Anesthesia Type: general    Vitals  Vitals Value Taken Time   /63 08/04/22 0935   Temp 97.8 °F (36.6 °C) 08/04/22 0828   Pulse 64 08/04/22 0939   Resp 16 08/04/22 0935   SpO2 100 % 08/04/22 0939   Vitals shown include unvalidated device data.        Post Anesthesia Care and Evaluation    Patient location during evaluation: bedside  Patient participation: complete - patient participated  Level of consciousness: awake and alert  Pain score: 0  Pain management: adequate    Airway patency: patent  Anesthetic complications: No anesthetic complications  PONV Status: none  Cardiovascular status: acceptable  Respiratory status: acceptable  Hydration status: acceptable

## 2022-08-05 LAB
LAB AP CASE REPORT: NORMAL
PATH REPORT.FINAL DX SPEC: NORMAL
PATH REPORT.GROSS SPEC: NORMAL

## 2022-08-08 ENCOUNTER — TELEPHONE (OUTPATIENT)
Dept: SURGERY | Facility: CLINIC | Age: 57
End: 2022-08-08

## 2022-08-08 ENCOUNTER — APPOINTMENT (OUTPATIENT)
Dept: NUCLEAR MEDICINE | Facility: HOSPITAL | Age: 57
End: 2022-08-08

## 2022-08-08 ENCOUNTER — HOSPITAL ENCOUNTER (OUTPATIENT)
Facility: HOSPITAL | Age: 57
Setting detail: OBSERVATION
Discharge: ANOTHER HOSPITAL | End: 2022-08-09
Attending: SURGERY | Admitting: SURGERY

## 2022-08-08 ENCOUNTER — OFFICE VISIT (OUTPATIENT)
Dept: SURGERY | Facility: CLINIC | Age: 57
End: 2022-08-08

## 2022-08-08 DIAGNOSIS — R79.89 ELEVATED LIVER FUNCTION TESTS: Primary | ICD-10-CM

## 2022-08-08 PROBLEM — N63.0 BREAST LUMP: Status: ACTIVE | Noted: 2022-08-08

## 2022-08-08 PROBLEM — S76.011S: Status: ACTIVE | Noted: 2018-07-30

## 2022-08-08 PROBLEM — N20.0 KIDNEY STONE: Status: ACTIVE | Noted: 2019-05-07

## 2022-08-08 PROBLEM — M70.61 TROCHANTERIC BURSITIS OF RIGHT HIP: Status: ACTIVE | Noted: 2018-07-03

## 2022-08-08 PROBLEM — G89.18 POST-OP PAIN: Status: ACTIVE | Noted: 2022-08-08

## 2022-08-08 PROBLEM — M70.62 TROCHANTERIC BURSITIS OF LEFT HIP: Status: ACTIVE | Noted: 2018-07-03

## 2022-08-08 PROBLEM — N17.9 ACUTE KIDNEY FAILURE (HCC): Status: ACTIVE | Noted: 2019-06-08

## 2022-08-08 PROBLEM — R10.9 ABDOMINAL PAIN: Status: ACTIVE | Noted: 2017-08-30

## 2022-08-08 PROBLEM — N18.30 STAGE 3 CHRONIC KIDNEY DISEASE (HCC): Status: ACTIVE | Noted: 2019-05-07

## 2022-08-08 PROBLEM — R23.3 ABNORMAL BRUISING: Status: ACTIVE | Noted: 2022-08-08

## 2022-08-08 LAB
HAV IGM SERPL QL IA: NORMAL
HBV CORE IGM SERPL QL IA: NORMAL
HBV SURFACE AG SERPL QL IA: NORMAL
HCV AB SER DONR QL: NORMAL
QT INTERVAL: 394 MS
SARS-COV-2 RNA PNL SPEC NAA+PROBE: NOT DETECTED

## 2022-08-08 PROCEDURE — 25010000002 MORPHINE PER 10 MG: Performed by: SURGERY

## 2022-08-08 PROCEDURE — 96366 THER/PROPH/DIAG IV INF ADDON: CPT

## 2022-08-08 PROCEDURE — 86664 EPSTEIN-BARR NUCLEAR ANTIGEN: CPT | Performed by: HOSPITALIST

## 2022-08-08 PROCEDURE — 94761 N-INVAS EAR/PLS OXIMETRY MLT: CPT

## 2022-08-08 PROCEDURE — C9803 HOPD COVID-19 SPEC COLLECT: HCPCS

## 2022-08-08 PROCEDURE — 0 TECHNETIUM TC 99M MEBROFENIN KIT: Performed by: SURGERY

## 2022-08-08 PROCEDURE — 99024 POSTOP FOLLOW-UP VISIT: CPT | Performed by: SURGERY

## 2022-08-08 PROCEDURE — 96361 HYDRATE IV INFUSION ADD-ON: CPT

## 2022-08-08 PROCEDURE — 25010000002 PIPERACILLIN SOD-TAZOBACTAM PER 1 G: Performed by: SURGERY

## 2022-08-08 PROCEDURE — A9537 TC99M MEBROFENIN: HCPCS | Performed by: SURGERY

## 2022-08-08 PROCEDURE — 93010 ELECTROCARDIOGRAM REPORT: CPT | Performed by: INTERNAL MEDICINE

## 2022-08-08 PROCEDURE — 86645 CMV ANTIBODY IGM: CPT | Performed by: HOSPITALIST

## 2022-08-08 PROCEDURE — 86644 CMV ANTIBODY: CPT | Performed by: HOSPITALIST

## 2022-08-08 PROCEDURE — 78226 HEPATOBILIARY SYSTEM IMAGING: CPT

## 2022-08-08 PROCEDURE — G0379 DIRECT REFER HOSPITAL OBSERV: HCPCS

## 2022-08-08 PROCEDURE — 86665 EPSTEIN-BARR CAPSID VCA: CPT | Performed by: HOSPITALIST

## 2022-08-08 PROCEDURE — 80074 ACUTE HEPATITIS PANEL: CPT | Performed by: HOSPITALIST

## 2022-08-08 PROCEDURE — 96375 TX/PRO/DX INJ NEW DRUG ADDON: CPT

## 2022-08-08 PROCEDURE — G0378 HOSPITAL OBSERVATION PER HR: HCPCS

## 2022-08-08 PROCEDURE — 99203 OFFICE O/P NEW LOW 30 MIN: CPT | Performed by: HOSPITALIST

## 2022-08-08 PROCEDURE — 25010000002 ONDANSETRON PER 1 MG: Performed by: SURGERY

## 2022-08-08 PROCEDURE — 87635 SARS-COV-2 COVID-19 AMP PRB: CPT | Performed by: SURGERY

## 2022-08-08 PROCEDURE — 96376 TX/PRO/DX INJ SAME DRUG ADON: CPT

## 2022-08-08 PROCEDURE — 96365 THER/PROPH/DIAG IV INF INIT: CPT

## 2022-08-08 PROCEDURE — 93005 ELECTROCARDIOGRAM TRACING: CPT | Performed by: HOSPITALIST

## 2022-08-08 PROCEDURE — 94799 UNLISTED PULMONARY SVC/PX: CPT

## 2022-08-08 RX ORDER — PANTOPRAZOLE SODIUM 40 MG/10ML
40 INJECTION, POWDER, LYOPHILIZED, FOR SOLUTION INTRAVENOUS
Status: DISCONTINUED | OUTPATIENT
Start: 2022-08-08 | End: 2022-08-09 | Stop reason: HOSPADM

## 2022-08-08 RX ORDER — ONDANSETRON 2 MG/ML
4 INJECTION INTRAMUSCULAR; INTRAVENOUS EVERY 4 HOURS PRN
Status: DISCONTINUED | OUTPATIENT
Start: 2022-08-08 | End: 2022-08-09 | Stop reason: HOSPADM

## 2022-08-08 RX ORDER — MORPHINE SULFATE 2 MG/ML
2 INJECTION, SOLUTION INTRAMUSCULAR; INTRAVENOUS
Status: DISCONTINUED | OUTPATIENT
Start: 2022-08-08 | End: 2022-08-09 | Stop reason: HOSPADM

## 2022-08-08 RX ORDER — ERGOCALCIFEROL 1.25 MG/1
2 CAPSULE ORAL
COMMUNITY

## 2022-08-08 RX ORDER — KIT FOR THE PREPARATION OF TECHNETIUM TC 99M MEBROFENIN 45 MG/10ML
1 INJECTION, POWDER, LYOPHILIZED, FOR SOLUTION INTRAVENOUS
Status: COMPLETED | OUTPATIENT
Start: 2022-08-08 | End: 2022-08-08

## 2022-08-08 RX ORDER — ALBUTEROL SULFATE 2.5 MG/3ML
2.5 SOLUTION RESPIRATORY (INHALATION) EVERY 6 HOURS PRN
Status: DISCONTINUED | OUTPATIENT
Start: 2022-08-08 | End: 2022-08-09 | Stop reason: HOSPADM

## 2022-08-08 RX ORDER — POTASSIUM CHLORIDE, DEXTROSE MONOHYDRATE AND SODIUM CHLORIDE 300; 5; 900 MG/100ML; G/100ML; MG/100ML
100 INJECTION, SOLUTION INTRAVENOUS CONTINUOUS
Status: DISCONTINUED | OUTPATIENT
Start: 2022-08-08 | End: 2022-08-09 | Stop reason: HOSPADM

## 2022-08-08 RX ADMIN — PIPERACILLIN AND TAZOBACTAM 4.5 G: 4; .5 INJECTION, POWDER, FOR SOLUTION INTRAVENOUS at 20:16

## 2022-08-08 RX ADMIN — MORPHINE SULFATE 2 MG: 2 INJECTION, SOLUTION INTRAMUSCULAR; INTRAVENOUS at 22:26

## 2022-08-08 RX ADMIN — MORPHINE SULFATE 2 MG: 2 INJECTION, SOLUTION INTRAMUSCULAR; INTRAVENOUS at 20:15

## 2022-08-08 RX ADMIN — PANTOPRAZOLE SODIUM 40 MG: 40 INJECTION, POWDER, FOR SOLUTION INTRAVENOUS at 13:58

## 2022-08-08 RX ADMIN — MORPHINE SULFATE 2 MG: 2 INJECTION, SOLUTION INTRAMUSCULAR; INTRAVENOUS at 15:36

## 2022-08-08 RX ADMIN — PIPERACILLIN AND TAZOBACTAM 4.5 G: 4; .5 INJECTION, POWDER, FOR SOLUTION INTRAVENOUS at 14:01

## 2022-08-08 RX ADMIN — MEBROFENIN 1 DOSE: 45 INJECTION, POWDER, LYOPHILIZED, FOR SOLUTION INTRAVENOUS at 13:54

## 2022-08-08 RX ADMIN — ONDANSETRON 4 MG: 2 INJECTION INTRAMUSCULAR; INTRAVENOUS at 22:28

## 2022-08-08 RX ADMIN — MORPHINE SULFATE 2 MG: 2 INJECTION, SOLUTION INTRAMUSCULAR; INTRAVENOUS at 18:02

## 2022-08-08 RX ADMIN — POTASSIUM CHLORIDE, DEXTROSE MONOHYDRATE AND SODIUM CHLORIDE 100 ML/HR: 300; 5; 900 INJECTION, SOLUTION INTRAVENOUS at 14:57

## 2022-08-08 NOTE — PROGRESS NOTES
Patient presents for 4 day po shelly ingram. She states that she is having increased pain to the RUQ that goes into her right shoulder. She states that it started this morning and she has pain level of 8 out of 10. She states that she is very nauseous.

## 2022-08-08 NOTE — PROGRESS NOTES
PATIENT INFORMATION  Chely Schaeffer       - 1965    CHIEF COMPLAINT  Chief Complaint   Patient presents with   • Post-op     Lap juan jose   Patient presents for 4 day po lap juan jose. She states that she is having increased pain to the RUQ that goes into her right shoulder. She states that it started this morning and she has pain level of 8 out of 10. She states that she is very nauseous.     HISTORY OF PRESENT ILLNESS  HPI chief complaint right upper quadrant and right shoulder pain nausea starting today.  She had a laparoscopic cholecystectomy by Dr. Reese 4 days ago.  She is well until this morning.  She says that she has a low-grade fever.  She denies any jaundice symptoms she does have nausea but no vomiting        REVIEW OF SYSTEMS  Review of Systems   Constitutional: Negative for activity change, chills, fever and unexpected weight change.   HENT: Negative for congestion.    Eyes: Negative for visual disturbance.   Respiratory: Negative for shortness of breath.    Cardiovascular: Negative for chest pain and palpitations.   Gastrointestinal: Positive for abdominal pain and nausea. Negative for blood in stool.   Endocrine: Negative for cold intolerance and heat intolerance.   Genitourinary: Negative for hematuria.   Musculoskeletal: Negative for gait problem.   Skin: Negative for color change.   Allergic/Immunologic: Negative for immunocompromised state.   Neurological: Negative for weakness and light-headedness.   Hematological: Negative for adenopathy.   Psychiatric/Behavioral: Negative for sleep disturbance. The patient is not nervous/anxious.          ACTIVE PROBLEMS  Patient Active Problem List    Diagnosis    • Abnormal bruising [R23.8]    • Breast lump [N63.0]    • Acute kidney failure (HCC) [N17.9]    • Kidney stone [N20.0]    • Stage 3 chronic kidney disease (HCC) [N18.30]    • Strain of gluteus medius, right, sequela [S76.011S]    • Trochanteric bursitis of right hip [M70.61]    •  Trochanteric bursitis of left hip [M70.62]    • Abdominal pain [R10.9]    • Smoking [F17.200]    • Hypothyroidism [E03.9]    • Abnormal thyroid blood test [R79.89]    • Anxiety [F41.9]    • Hypokalemia [E87.6]    • Mixed hyperlipidemia [E78.2]    • Ovarian cyst, left [N83.202]    • Multiple thyroid nodules [E04.2]    • Vitamin D deficiency [E55.9]    • Anemia [D64.9]    • Arthralgia [M25.50]    • Other malaise and fatigue [R53.81, R53.83]    • Asthma, moderate persistent [J45.40]    • Chronic back pain [M54.9, G89.29]    • Functional constipation [K59.04]    • Hip pain, bilateral [M25.551, M25.552]    • Radiculopathy with lower extremity symptoms [M54.10]    • Murmur, cardiac [R01.1]    • Thyromegaly [E01.0]    • Abnormal weight gain [R63.5]    • Benign essential hypertension [I10]    • DDD (degenerative disc disease), lumbosacral [M51.37]    • Depression with anxiety [F41.8]    • Insomnia [G47.00]    • LVH (left ventricular hypertrophy) [I51.7]          PAST MEDICAL HISTORY  Past Medical History:   Diagnosis Date   • Asthma    • Chronic constipation    • Chronic fatigue    • CKD (chronic kidney disease), stage III (HCC)     pcp following currently   • COVID-19 2022    test at [facility]   • Heart murmur    • Hyperlipidemia    • Hypertension    • IBS (irritable bowel syndrome)    • RA (rheumatoid arthritis) (HCC)    • Rectal bleed          SURGICAL HISTORY  Past Surgical History:   Procedure Laterality Date   • ANKLE SURGERY Left     fracture   •  SECTION      x3   • CHOLECYSTECTOMY WITH INTRAOPERATIVE CHOLANGIOGRAM N/A 2022    Procedure: CHOLECYSTECTOMY LAPAROSCOPIC INTRAOPERATIVE CHOLANGIOGRAM;  Surgeon: Penny Reese DO;  Location: [facility];  Service: General;  Laterality: N/A;  LAPAROSCOPIC CHOLECYSTECTOMY INTRAOPERATIVE CHOLANGIOGRAM   • COLONOSCOPY     • ENDOSCOPY     • FULGURATION ENDOMETRIOSIS      x3   • HERNIA REPAIR      umbilical x2   • HIP SURGERY Right      muscle/tendon reattachment   • HYSTERECTOMY     • KIDNEY STONE SURGERY     • SHOULDER SURGERY Right     fracture   • TONSILLECTOMY           FAMILY HISTORY  Family History   Problem Relation Age of Onset   • Hypertension Mother    • Breast cancer Mother    • Leukemia Mother    • Diabetes Father    • Heart disease Father    • Hypertension Father    • Acute lymphoblastic leukemia Daughter    • Hypertension Other    • Malig Hyperthermia Neg Hx          SOCIAL HISTORY  Social History     Occupational History   • Not on file   Tobacco Use   • Smoking status: Former Smoker     Packs/day: 1.00     Years: 2.00     Pack years: 2.00     Quit date: 2021     Years since quittin.7   • Smokeless tobacco: Never Used   Vaping Use   • Vaping Use: Never used   Substance and Sexual Activity   • Alcohol use: Not Currently   • Drug use: No   • Sexual activity: Defer         CURRENT MEDICATIONS    Current Outpatient Medications:   •  albuterol sulfate  (90 Base) MCG/ACT inhaler, Inhale 2 puffs Every 4 (Four) Hours As Needed for Wheezing or Shortness of Air., Disp: , Rfl:   •  amLODIPine (NORVASC) 5 MG tablet, Take 5 mg by mouth Every Evening., Disp: , Rfl:   •  ergocalciferol (ERGOCALCIFEROL) 1.25 MG (98362 UT) capsule, Take 2 capsules by mouth. Holding due to high levels, Disp: , Rfl:   •  esomeprazole (nexIUM) 20 MG capsule, Take 20 mg by mouth Every Morning Before Breakfast., Disp: , Rfl:   •  HYDROcodone-acetaminophen (Norco) 5-325 MG per tablet, Take 1 tablet by mouth Every 4 (Four) Hours As Needed for Moderate Pain or Severe Pain., Disp: 30 tablet, Rfl: 0  •  linaclotide (LINZESS) 72 MCG capsule capsule, Take 1 capsule by mouth Daily., Disp: , Rfl:   •  olmesartan-hydrochlorothiazide (BENICAR HCT) 40-25 MG per tablet, Take 1 tablet by mouth Daily., Disp: , Rfl:   •  rosuvastatin (CRESTOR) 5 MG tablet, Take 5 mg by mouth Every Night., Disp: , Rfl:   •  Zolpidem Tartrate (AMBIEN PO), Take 5 mg by mouth At Night As  Needed (sleep)., Disp: , Rfl:     ALLERGIES  Patient has no known allergies.    VITALS  There were no vitals filed for this visit.    LAST RESULTS   Admission on 08/04/2022, Discharged on 08/04/2022   Component Date Value Ref Range Status   • Glucose 08/04/2022 101 (A) 65 - 99 mg/dL Final   • BUN 08/04/2022 25 (A) 6 - 20 mg/dL Final   • Creatinine 08/04/2022 0.97  0.57 - 1.00 mg/dL Final   • Sodium 08/04/2022 139  136 - 145 mmol/L Final   • Potassium 08/04/2022 4.1  3.5 - 5.2 mmol/L Final   • Chloride 08/04/2022 104  98 - 107 mmol/L Final   • CO2 08/04/2022 21.5 (A) 22.0 - 29.0 mmol/L Final   • Calcium 08/04/2022 9.5  8.6 - 10.5 mg/dL Final   • Total Protein 08/04/2022 7.1  6.0 - 8.5 g/dL Final   • Albumin 08/04/2022 4.30  3.50 - 5.20 g/dL Final   • ALT (SGPT) 08/04/2022 22  1 - 33 U/L Final   • AST (SGOT) 08/04/2022 19  1 - 32 U/L Final   • Alkaline Phosphatase 08/04/2022 85  39 - 117 U/L Final   • Total Bilirubin 08/04/2022 0.2  0.0 - 1.2 mg/dL Final   • Globulin 08/04/2022 2.8  gm/dL Final   • A/G Ratio 08/04/2022 1.5  g/dL Final   • BUN/Creatinine Ratio 08/04/2022 25.8 (A) 7.0 - 25.0 Final   • Anion Gap 08/04/2022 13.5  5.0 - 15.0 mmol/L Final   • eGFR 08/04/2022 68.3  >60.0 mL/min/1.73 Final    National Kidney Foundation and American Society of Nephrology (ASN) Task Force recommended calculation based on the Chronic Kidney Disease Epidemiology Collaboration (CKD-EPI) equation refit without adjustment for race.   • WBC 08/04/2022 7.24  3.40 - 10.80 10*3/mm3 Final   • RBC 08/04/2022 4.73  3.77 - 5.28 10*6/mm3 Final   • Hemoglobin 08/04/2022 13.5  12.0 - 15.9 g/dL Final   • Hematocrit 08/04/2022 39.8  34.0 - 46.6 % Final   • MCV 08/04/2022 84.1  79.0 - 97.0 fL Final   • MCH 08/04/2022 28.5  26.6 - 33.0 pg Final   • MCHC 08/04/2022 33.9  31.5 - 35.7 g/dL Final   • RDW 08/04/2022 13.0  12.3 - 15.4 % Final   • RDW-SD 08/04/2022 39.8  37.0 - 54.0 fl Final   • MPV 08/04/2022 11.2  6.0 - 12.0 fL Final   • Platelets  08/04/2022 268  140 - 450 10*3/mm3 Final   • Neutrophil % 08/04/2022 58.1  42.7 - 76.0 % Final   • Lymphocyte % 08/04/2022 29.6  19.6 - 45.3 % Final   • Monocyte % 08/04/2022 7.9  5.0 - 12.0 % Final   • Eosinophil % 08/04/2022 3.2  0.3 - 6.2 % Final   • Basophil % 08/04/2022 1.1  0.0 - 1.5 % Final   • Immature Grans % 08/04/2022 0.1  0.0 - 0.5 % Final   • Neutrophils, Absolute 08/04/2022 4.21  1.70 - 7.00 10*3/mm3 Final   • Lymphocytes, Absolute 08/04/2022 2.14  0.70 - 3.10 10*3/mm3 Final   • Monocytes, Absolute 08/04/2022 0.57  0.10 - 0.90 10*3/mm3 Final   • Eosinophils, Absolute 08/04/2022 0.23  0.00 - 0.40 10*3/mm3 Final   • Basophils, Absolute 08/04/2022 0.08  0.00 - 0.20 10*3/mm3 Final   • Immature Grans, Absolute 08/04/2022 0.01  0.00 - 0.05 10*3/mm3 Final   • nRBC 08/04/2022 0.0  0.0 - 0.2 /100 WBC Final   • Case Report 08/04/2022    Final                    Value:Surgical Pathology Report                         Case: [id number]                                  Authorizing Provider:  Penny Reese DO    Collected:           08/04/2022 08:11 AM          Ordering Location:     [facility]   Received:            08/04/2022 08:52 AM                                 OR                                                                           Pathologist:           Andrea Prado MD                                                         Specimen:    Gallbladder, GALLBLADDER                                                                  • Final Diagnosis 08/04/2022    Final                    Value:This result contains rich text formatting which cannot be displayed here.   • Gross Description 08/04/2022    Final                    Value:This result contains rich text formatting which cannot be displayed here.     FL Cholangiogram Operative    Result Date: 8/4/2022  Narrative: FLUOROSCOPY DURING INTRAOPERATIVE CHOLANGIOGRAM, 08/04/2022  HISTORY: Fluoroscopy during laparoscopic  cholecystectomy and intraoperative cholangiogram performed earlier today by Dr. Reese.  REPORT: C-arm fluoroscopy was provided by radiology personnel in the OR during intraoperative cholangiogram. Fluoroscopy time was recorded as 34 seconds. Spot images recorded for documentation purposes: 2. Extrahepatic bile duct, long cystic duct with lower CBD insertion and retrograde filling of downstream pancreatic duct. Intrahepatic ducts are not visible. No filling defect is visible within the extrahepatic bile duct to suggest bile duct stone. Please see operative note for detailed findings.  This report was finalized on 8/4/2022 9:37 AM by Dr. Hernán Mays MD.        PHYSICAL EXAM  Debilities/Disabilities Identified: None  Emotional Behavior: Appropriate  Physical Exam alert overweight white female in no active distress.  She does not have any clinical jaundice.  She appears in mild distress she is afebrile in the office.  Heart shows a regular rate and rhythm lungs are clear and equal with normal wall mild periumbilical ecchymosis with mild diffuse tenderness.  CT scan of the abdomen from [facility] was reviewed by myself and shows a small amount of fluid in the liver bed and overlying the liver.  There is no appreciable ductal dilatation.  White blood count is elevated to 13.82 hemoglobin is 14.97 platelet count is 297.  Her liver function tests are elevated the SGOT is elevated at 558 ALT is elevated at 1031 alkaline phosphatase is elevated 187 total bilirubin is elevated 1.3 lipase is normal her preoperative liver function tests were normal.  Pathology report from her gallbladder showed gallbladder polyps and chronic cholecystitis.  Operative note was reviewed and showed a normal cholangiogram.  She is on Crestor    ASSESSMENT  Postoperative right upper quadrant pain  Elevated liver function tests with an elevated white blood count  Possible biliary leak, possible common bile duct stone, possible other etiology  of elevated liver function tests i.e. statin use versus viral in nature      PLAN  The risk benefits and options were discussed with her and her  in detail.  I offered her admission here with work-up with an MRCP and an HIDA scan versus transfer to another facility.  Unfortunately gastroenterology who would be able to perform an ERCP is off for the week and his covering physician does not perform an ERCP.  We have a call out to gastroenterology at [facility] to see how they would like to proceed.  We have been unable to get a callback from gastroenterology she is feeling worse as such we will admit her to the hospital and initiate work-up here.

## 2022-08-08 NOTE — PLAN OF CARE
Goal Outcome Evaluation:  Plan of Care Reviewed With: patient        Progress: no change  Outcome Evaluation: Patient admitted to med surg. Oriented to room and unit. Plan explained. Questions answered. Call light within reach. NPO. HIDA scan and EKG done today. Start IVFs and IV antibiotics. Morphine providing adequate pain control at this time. Waiting for bed at [facility].

## 2022-08-08 NOTE — DISCHARGE SUMMARY
Admission date 8/8/2022  Transfer date 8/9/2022  Admission diagnosis postoperative right upper quadrant pain status post laparoscopic cholecystectomy elevated liver function test  Discharge diagnosis right upper quadrant abdominal pain status post laparoscopic cholecystectomy elevated liver function tests and biliary leak  Prognosis pending ERCP  Condition stable  Hospital course patient underwent a laparoscopic cholecystectomy by Dr. Reese  8/4/2022.  Operative note was reviewed and showed no apparent complications, intraoperative cystic duct cholangiogram was normal.  Preoperatively her liver function tests were normal.  She developed pain today with nausea went to [facility] emergency room had a CT scan and lab work performed.  Lab work showed an elevated white blood count 13,820, elevated SGOT 558, elevated ALT at 1031 with a mildly elevated bilirubin at 1.3.  Her serum lipase is normal.  Phosphatase is elevated to 187.  Pathology report from her gallbladder showed chronic cholecystitis and gallbladder polyps.  She is on Crestor.  She was seen in the office and admitted to the hospital.  She was started on IV fluids with potassium, her potassium was low at 3.2.  HIDA scan was ordered and is consistent with a bile leak.  In my opinion she needs an ERCP as the next step in her work-up.  That test is not currently available at this facility at least for 7 days.  Her vital signs are stable.  I spoke with the hospitalist service at [facility] and they have agreed to accept her in transfer to facilitate her work-up and care.  The transfer center is facilitating her transfer hopefully sometime today.

## 2022-08-08 NOTE — CONSULTS
[facility] HOSPITALIST     Gracie Walker APRN    Reason for Consultation: Medical Management of Hypertension    HISTORY OF PRESENT ILLNESS:    Ms. Schaeffer is a pleasant, 56 y/o  female who underwent successful lap juan jose on  due to cholelithiasis.  The procedure was uneventful, and she was discharged home.  Her post-op course over the weekend was unremarkable.  She was tolerating PO and pain was controlled by Lortab prescribed at discharge.  However, she developed acute abdominal pain around 04:30 this morning.  She describes it as pressure.  She does report she felt a little constipated yesterday, but this resolved w/ bowel movement.  This did not change the pressure.  She denies fever and chills.  She does note some nausea, but no vomiting.  The pressure has not waned since being seen in the ER.  She cannot identify and relieving factors.  She denies initiation of any new medications outside of the Lortab which she has taken before.  No sick contacts.       Past Medical History:   Diagnosis Date   • Asthma    • Chronic constipation    • Chronic fatigue    • CKD (chronic kidney disease), stage III (HCC)     pcp following currently   • COVID-19 2022    test at [facility]   • Heart murmur    • Hyperlipidemia    • Hypertension    • IBS (irritable bowel syndrome)    • RA (rheumatoid arthritis) (HCC)    • Rectal bleed      Past Surgical History:   Procedure Laterality Date   • ANKLE SURGERY Left     fracture   •  SECTION      x3   • CHOLECYSTECTOMY WITH INTRAOPERATIVE CHOLANGIOGRAM N/A 2022    Procedure: CHOLECYSTECTOMY LAPAROSCOPIC INTRAOPERATIVE CHOLANGIOGRAM;  Surgeon: Penny Reese DO;  Location: [facility];  Service: General;  Laterality: N/A;  LAPAROSCOPIC CHOLECYSTECTOMY INTRAOPERATIVE CHOLANGIOGRAM   • COLONOSCOPY     • ENDOSCOPY     • FULGURATION ENDOMETRIOSIS      x3   • HERNIA REPAIR      umbilical x2   • HIP SURGERY  Right     muscle/tendon reattachment   • HYSTERECTOMY     • KIDNEY STONE SURGERY     • SHOULDER SURGERY Right     fracture   • TONSILLECTOMY       Family History   Problem Relation Age of Onset   • Hypertension Mother    • Breast cancer Mother    • Leukemia Mother    • Diabetes Father    • Heart disease Father    • Hypertension Father    • Acute lymphoblastic leukemia Daughter    • Hypertension Other    • Malig Hyperthermia Neg Hx      Social History     Tobacco Use   • Smoking status: Former Smoker     Packs/day: 1.00     Years: 2.00     Pack years: 2.00     Quit date: 2021     Years since quittin.7   • Smokeless tobacco: Never Used   Vaping Use   • Vaping Use: Never used   Substance Use Topics   • Alcohol use: Not Currently   • Drug use: No     Medications Prior to Admission   Medication Sig Dispense Refill Last Dose   • albuterol sulfate  (90 Base) MCG/ACT inhaler Inhale 2 puffs Every 4 (Four) Hours As Needed for Wheezing or Shortness of Air.   Past Month at Unknown time   • amLODIPine (NORVASC) 5 MG tablet Take 5 mg by mouth Every Evening.   2022 at Unknown time   • ergocalciferol (ERGOCALCIFEROL) 1.25 MG (12977 UT) capsule Take 2 capsules by mouth. Holding due to high levels   2022 at Unknown time   • esomeprazole (nexIUM) 20 MG capsule Take 20 mg by mouth Every Morning Before Breakfast.   2022 at Unknown time   • HYDROcodone-acetaminophen (Norco) 5-325 MG per tablet Take 1 tablet by mouth Every 4 (Four) Hours As Needed for Moderate Pain or Severe Pain. 30 tablet 0 2022 at 0430   • linaclotide (LINZESS) 72 MCG capsule capsule Take 1 capsule by mouth Daily.   2022 at Unknown time   • olmesartan-hydrochlorothiazide (BENICAR HCT) 40-25 MG per tablet Take 1 tablet by mouth Daily.   2022 at Unknown time   • rosuvastatin (CRESTOR) 5 MG tablet Take 5 mg by mouth Every Night.   2022 at Unknown time   • Zolpidem Tartrate (AMBIEN PO) Take 5 mg by mouth At Night As Needed (sleep).  "  8/7/2022 at Unknown time     Allergies:  Patient has no known allergies.    REVIEW OF SYSTEMS:  Please see the above history of present illness for pertinent positives and negatives.  The remainder of the patient's systems have been reviewed and are negative.    Vital Signs  Temp:  [97.8 °F (36.6 °C)] 97.8 °F (36.6 °C)  Heart Rate:  [105] 105  Resp:  [17] 17  BP: (133)/(83) 133/83  Oxygen Therapy  SpO2: 98 %  Pulse Oximetry Type: Intermittent  Device (Oxygen Therapy): room air}  Body mass index is 36.01 kg/m².     Flowsheet Rows    Flowsheet Row First Filed Value   Admission Height 160 cm (63\") Documented at 08/08/2022 1231   Admission Weight 92.2 kg (203 lb 4.8 oz) Documented at 08/08/2022 1231           Physical Exam:  Physical Exam   Constitutional: Patient appears well-developed and well-nourished and in mild distress.  Appears older than stated age.   HEENT:   Head: Normocephalic and atraumatic.   Eyes:  Pupils are equal, round, and reactive to light. EOM are intact. Sclerae are anicteric and noninjected.  Cardiovascular: Regular rate, regular rhythm, S1 normal and S2 normal.  Exam reveals no gallop and no friction rub.  No murmur heard.  Radial and pedal pulses are 2+ and symmetric.  Pulmonary/Chest: Lungs are clear to auscultation bilaterally. No respiratory distress. No wheezes. No rhonchi. No rales.   Abdominal: Obese. Bowel sounds are diminished.  Dressings are intact.  Musculoskeletal: Normal muscle tone  Extremities: No edema.   Neurological: Cranial nerves II-XII are grossly intact with no focal deficits.  Skin: Skin is warm. No rash noted. Nails show no clubbing.  No cyanosis or erythema.       Results Review:    I reviewed the patient's new clinical results.  Lab Results (most recent)     Labs from [facility] reviewed and compared to labs from 8/4/2022          Imaging Results (Most Recent)     None          ECG/EMG Results (most recent)     Pending          Impressions/Recommendations:    1.  " Transaminitis: This is acute as her biochemical markers were normal on Thursday.  Lortab?  Infection?  There is no evidence of shock.  I believe her pain may be from a capsulitis due inflammation.  Will hold statin therapy for now.  I've added an acute hep panel as well as an EBV and CMV study.  Dr. Cade pursing HIDA and MRCP.  Will continue to monitor trend.    2.  Essential Hypertension: Near goal on exam.  Will continue ARB therapy, but hold Norvasc given possibility of causing hepatitis.  I'm also going to hold her HCTZ.    3.  Dyslipidemia: Holding Crestor.    4.  CKDII: Creatinine appears to be about her baseline.    Larry Patel MD  08/08/22  12:57 EDT

## 2022-08-09 VITALS
BODY MASS INDEX: 36.02 KG/M2 | HEIGHT: 63 IN | WEIGHT: 203.3 LBS | RESPIRATION RATE: 16 BRPM | SYSTOLIC BLOOD PRESSURE: 168 MMHG | HEART RATE: 72 BPM | TEMPERATURE: 98.1 F | DIASTOLIC BLOOD PRESSURE: 79 MMHG | OXYGEN SATURATION: 91 %

## 2022-08-09 LAB
ALBUMIN SERPL-MCNC: 3.5 G/DL (ref 3.5–5.2)
ALBUMIN/GLOB SERPL: 1 G/DL
ALP SERPL-CCNC: 224 U/L (ref 39–117)
ALT SERPL W P-5'-P-CCNC: 661 U/L (ref 1–33)
ANION GAP SERPL CALCULATED.3IONS-SCNC: 11.9 MMOL/L (ref 5–15)
AST SERPL-CCNC: 172 U/L (ref 1–32)
BASOPHILS # BLD AUTO: 0.04 10*3/MM3 (ref 0–0.2)
BASOPHILS NFR BLD AUTO: 0.3 % (ref 0–1.5)
BILIRUB SERPL-MCNC: 1.1 MG/DL (ref 0–1.2)
BUN SERPL-MCNC: 17 MG/DL (ref 6–20)
BUN/CREAT SERPL: 21.3 (ref 7–25)
CALCIUM SPEC-SCNC: 9.2 MG/DL (ref 8.6–10.5)
CHLORIDE SERPL-SCNC: 99 MMOL/L (ref 98–107)
CMV IGG SERPL IA-ACNC: <0.6 U/ML (ref 0–0.59)
CMV IGM SERPL IA-ACNC: <30 AU/ML (ref 0–29.9)
CO2 SERPL-SCNC: 23.1 MMOL/L (ref 22–29)
CREAT SERPL-MCNC: 0.8 MG/DL (ref 0.57–1)
DEPRECATED RDW RBC AUTO: 44.3 FL (ref 37–54)
EBV NA IGG SER IA-ACNC: 572 U/ML (ref 0–17.9)
EBV VCA IGG SER IA-ACNC: 134 U/ML (ref 0–17.9)
EBV VCA IGM SER IA-ACNC: <36 U/ML (ref 0–35.9)
EGFRCR SERPLBLD CKD-EPI 2021: 86.1 ML/MIN/1.73
EOSINOPHIL # BLD AUTO: 0.23 10*3/MM3 (ref 0–0.4)
EOSINOPHIL NFR BLD AUTO: 1.6 % (ref 0.3–6.2)
ERYTHROCYTE [DISTWIDTH] IN BLOOD BY AUTOMATED COUNT: 13.9 % (ref 12.3–15.4)
GLOBULIN UR ELPH-MCNC: 3.5 GM/DL
GLUCOSE SERPL-MCNC: 134 MG/DL (ref 65–99)
HCT VFR BLD AUTO: 40.7 % (ref 34–46.6)
HGB BLD-MCNC: 13 G/DL (ref 12–15.9)
IMM GRANULOCYTES # BLD AUTO: 0.12 10*3/MM3 (ref 0–0.05)
IMM GRANULOCYTES NFR BLD AUTO: 0.8 % (ref 0–0.5)
LYMPHOCYTES # BLD AUTO: 1.92 10*3/MM3 (ref 0.7–3.1)
LYMPHOCYTES NFR BLD AUTO: 13.6 % (ref 19.6–45.3)
MCH RBC QN AUTO: 28.1 PG (ref 26.6–33)
MCHC RBC AUTO-ENTMCNC: 31.9 G/DL (ref 31.5–35.7)
MCV RBC AUTO: 87.9 FL (ref 79–97)
MONOCYTES # BLD AUTO: 1.61 10*3/MM3 (ref 0.1–0.9)
MONOCYTES NFR BLD AUTO: 11.4 % (ref 5–12)
NEUTROPHILS NFR BLD AUTO: 10.24 10*3/MM3 (ref 1.7–7)
NEUTROPHILS NFR BLD AUTO: 72.3 % (ref 42.7–76)
NRBC BLD AUTO-RTO: 0 /100 WBC (ref 0–0.2)
PLATELET # BLD AUTO: 253 10*3/MM3 (ref 140–450)
PMV BLD AUTO: 11.9 FL (ref 6–12)
POTASSIUM SERPL-SCNC: 4.4 MMOL/L (ref 3.5–5.2)
PROT SERPL-MCNC: 7 G/DL (ref 6–8.5)
RBC # BLD AUTO: 4.63 10*6/MM3 (ref 3.77–5.28)
SERVICE CMNT-IMP: ABNORMAL
SODIUM SERPL-SCNC: 134 MMOL/L (ref 136–145)
WBC NRBC COR # BLD: 14.16 10*3/MM3 (ref 3.4–10.8)

## 2022-08-09 PROCEDURE — 99024 POSTOP FOLLOW-UP VISIT: CPT | Performed by: SURGERY

## 2022-08-09 PROCEDURE — 25010000002 PIPERACILLIN SOD-TAZOBACTAM PER 1 G: Performed by: SURGERY

## 2022-08-09 PROCEDURE — 96376 TX/PRO/DX INJ SAME DRUG ADON: CPT

## 2022-08-09 PROCEDURE — 96366 THER/PROPH/DIAG IV INF ADDON: CPT

## 2022-08-09 PROCEDURE — 80053 COMPREHEN METABOLIC PANEL: CPT | Performed by: SURGERY

## 2022-08-09 PROCEDURE — 25010000002 MORPHINE PER 10 MG: Performed by: SURGERY

## 2022-08-09 PROCEDURE — 96361 HYDRATE IV INFUSION ADD-ON: CPT

## 2022-08-09 PROCEDURE — G0378 HOSPITAL OBSERVATION PER HR: HCPCS

## 2022-08-09 PROCEDURE — 94761 N-INVAS EAR/PLS OXIMETRY MLT: CPT

## 2022-08-09 PROCEDURE — 94799 UNLISTED PULMONARY SVC/PX: CPT

## 2022-08-09 PROCEDURE — 85025 COMPLETE CBC W/AUTO DIFF WBC: CPT | Performed by: SURGERY

## 2022-08-09 RX ADMIN — PANTOPRAZOLE SODIUM 40 MG: 40 INJECTION, POWDER, FOR SOLUTION INTRAVENOUS at 06:01

## 2022-08-09 RX ADMIN — MORPHINE SULFATE 2 MG: 2 INJECTION, SOLUTION INTRAMUSCULAR; INTRAVENOUS at 09:23

## 2022-08-09 RX ADMIN — MORPHINE SULFATE 2 MG: 2 INJECTION, SOLUTION INTRAMUSCULAR; INTRAVENOUS at 14:29

## 2022-08-09 RX ADMIN — MORPHINE SULFATE 2 MG: 2 INJECTION, SOLUTION INTRAMUSCULAR; INTRAVENOUS at 11:59

## 2022-08-09 RX ADMIN — PIPERACILLIN AND TAZOBACTAM 4.5 G: 4; .5 INJECTION, POWDER, FOR SOLUTION INTRAVENOUS at 11:59

## 2022-08-09 RX ADMIN — POTASSIUM CHLORIDE, DEXTROSE MONOHYDRATE AND SODIUM CHLORIDE 100 ML/HR: 300; 5; 900 INJECTION, SOLUTION INTRAVENOUS at 01:04

## 2022-08-09 RX ADMIN — MORPHINE SULFATE 2 MG: 2 INJECTION, SOLUTION INTRAMUSCULAR; INTRAVENOUS at 06:01

## 2022-08-09 RX ADMIN — PIPERACILLIN AND TAZOBACTAM 4.5 G: 4; .5 INJECTION, POWDER, FOR SOLUTION INTRAVENOUS at 06:01

## 2022-08-09 RX ADMIN — MORPHINE SULFATE 2 MG: 2 INJECTION, SOLUTION INTRAMUSCULAR; INTRAVENOUS at 00:58

## 2022-08-09 NOTE — PROGRESS NOTES
Patient's chart reviewed by me including vital signs, laboratory values, nursing notes and primary team notes.  Note made of plan to transfer.  Blood pressure currently at goal.  No changes to current medicine plan, will await transfer.

## 2022-08-09 NOTE — PLAN OF CARE
Goal Outcome Evaluation:  Plan of Care Reviewed With: patient, family        Progress: no change  Outcome Evaluation: NPO - IV fluids and antibiotics - c/o pain with morphine given q 2hr - pt wt shifts independently in bed - bed alarm in use - transfer to [facility] when room available

## 2022-08-09 NOTE — PROGRESS NOTES
She feels a little bit better today.  She denies any nausea or vomiting.  She is afebrile vital signs are stable.  Her abdomen is soft and nontender.  Her liver function tests are slightly improved.  Her white blood count remains elevated at 14,000.  We called the transfer center this morning [facility] will determine whether have the ability to accept at a 9:00 meeting.  If they are unable to accept we will pursue other avenues for transfer

## 2022-08-09 NOTE — PROGRESS NOTES
I spoke with Dr. Garett Mckeon he has agreed to accept the patient in transfer to [facility].  We will copy her cholangiogram and her HIDA scan results to a disc and include her CT scan from [place] to go with the patient

## 2022-08-09 NOTE — CASE MANAGEMENT/SOCIAL WORK
Discharge Planning Assessment  RYLEE Murphy     Patient Name: Chely Schaeffer  MRN: [id number]  Today's Date: 8/9/2022    Admit Date: 8/8/2022     Discharge Needs Assessment     Row Name 08/09/22 1132       Living Environment    People in Home spouse    Name(s) of People in Home Ceasar Schaeffer,     Current Living Arrangements home    Duration at Residence 15 Y    Potentially Unsafe Housing Conditions --  none    Primary Care Provided by self    Provides Primary Care For no one    Caregiving Concerns no care giving concerns voiced by patient at this time.    Family Caregiver if Needed spouse    Family Caregiver Names ni Mcdonough    Quality of Family Relationships helpful;involved;supportive    Able to Return to Prior Arrangements yes    Living Arrangement Comments Patient states she lives with her  in a two story house with a basement and four steps to gain entry       Resource/Environmental Concerns    Resource/Environmental Concerns none    Transportation Concerns none       Transition Planning    Patient/Family Anticipates Transition to home with family    Patient/Family Anticipated Services at Transition none    Transportation Anticipated family or friend will provide  pt states that her  will be able to provide ride home at discharge       Discharge Needs Assessment    Readmission Within the Last 30 Days no previous admission in last 30 days    Current Outpatient/Agency/Support Group --  none    Equipment Currently Used at Home none    Concerns to be Addressed denies needs/concerns at this time;discharge planning    Concerns Comments pt voiced no discharge needs at this time.    Anticipated Changes Related to Illness none    Equipment Needed After Discharge none    Outpatient/Agency/Support Group Needs --  pt states she will not need these services at this time.    Discharge Facility/Level of Care Needs --  pt states she will not need these services at discharge    Provided Post Acute  Provider List? Refused    Refused Provider List Comment pt declined    Patient's Choice of Community Agency(s) none    Current Discharge Risk --  none    Discharge Coordination/Progress Patient states she plans on returning home at discharge with her  to help as needed, no discharge needs voiced by patient at this time.               Discharge Plan     Row Name 08/09/22 1138       Plan    Plan Home with     Patient/Family in Agreement with Plan yes    Plan Comments Into room and introduced self and role of CM. Discussed discharge disposition with patient and her  with permission. Patient is currently resting in bed with no complaints. Patient confirms that the info on her face sheet is correct and that she see's SHANA Pat as PCP. She states that she uses [facility] in [place] and currently has no problem picking up or paying for her meds. She also states that she does not have a living will and declines information regarding one. Patient states that she lives wither  in a two story house with a basement and four steps to gain entry and states that she has no issues entering the home or maneuvering inside. She states that she is independent with her ADL's, works and drives and that her  will be able to provide ride home at discharge. She also states that she currently does not use any DME at home and does not anticipate needing any equipment at discharge. Patient states she has not used home health in the past and states she will not need this service at discharge. CM offered community resources such as HH, STR and LTC but patient declines the need for them at this time. Patient states she plans on returning home at discharge with her  to help if needed, no discharge needs voiced by patient at this time. Patient and  had no other questions or concerns regarding discharge plans. Name and number placed on white board in room. CM will continue to  follow for needs.              Continued Care and Services - Admitted Since 8/8/2022    Coordination has not been started for this encounter.          Demographic Summary     Row Name 08/09/22 1132       General Information    Admission Type observation    Arrived From home    Referral Source admission list    Reason for Consult discharge planning    Preferred Language English       Contact Information    Permission Granted to Share Info With                Functional Status    No documentation.                Psychosocial    No documentation.                Abuse/Neglect    No documentation.                Legal    No documentation.                Substance Abuse    No documentation.                Patient Forms    No documentation.                   Jordana Hughes RN

## 2022-08-10 NOTE — CASE MANAGEMENT/SOCIAL WORK
Case Management Discharge Note      Final Note: Discharged to [facility]    Provided Post Acute Provider List?: Refused  Refused Provider List Comment: pt declined    Selected Continued Care - Discharged on 8/9/2022 Admission date: 8/8/2022 - Discharge disposition: Hospice/Medical Facility (DC - External)    Destination Coordination complete.    Service Provider Selected Services Address Phone Fax Patient Preferred    [facility] [address] [id number] [phone] -- --          Durable Medical Equipment    No services have been selected for the patient.              Dialysis/Infusion    No services have been selected for the patient.              Home Medical Care    No services have been selected for the patient.              Therapy    No services have been selected for the patient.              Community Resources    No services have been selected for the patient.              Community & DME    No services have been selected for the patient.                       Final Discharge Disposition Code: 02 - short term hospital for [facility]

## 2022-08-12 ENCOUNTER — TELEPHONE (OUTPATIENT)
Dept: SURGERY | Facility: CLINIC | Age: 57
End: 2022-08-12

## 2022-08-12 NOTE — TELEPHONE ENCOUNTER
Provider: DR CAN    Caller: NATY MYLES      Relationship to Patient: SELF    Phone Number: [phone]      Reason for Call: PT REQUESTED A RETURN TOP WORK LETTER BE FAXED [phone], BUT HER EMPLOYER SAYS THEY DIDN'T RECEIVE. I SEE THE LETTER IN THE CHART. CAN WE REFAX TODAY SO PT CAN RETURN TO WORK ON Monday.      Attempted to Warm Transfer but, unable to reach the Practice:

## 2022-08-15 ENCOUNTER — APPOINTMENT (OUTPATIENT)
Dept: PREADMISSION TESTING | Facility: HOSPITAL | Age: 57
End: 2022-08-15

## 2022-08-31 ENCOUNTER — OFFICE VISIT (OUTPATIENT)
Dept: SURGERY | Facility: CLINIC | Age: 57
End: 2022-08-31

## 2022-08-31 DIAGNOSIS — Z90.49 STATUS POST LAPAROSCOPIC CHOLECYSTECTOMY: Primary | ICD-10-CM

## 2022-08-31 DIAGNOSIS — J98.11 ATELECTASIS: ICD-10-CM

## 2022-08-31 PROCEDURE — 99024 POSTOP FOLLOW-UP VISIT: CPT | Performed by: SURGERY

## 2022-08-31 NOTE — PROGRESS NOTES
Chely Schaeffer 57 y.o. female presents for PO FU. Pt is having some issues with bloating.       HPI   Above noted and agree.  Cass had a bit of a complicated course.  After her laparoscopic cholecystectomy, she developed a bile leak from a duct of Luschka.  She had been transferred to [facility] and had an ERCP with stent placement.  She also said she developed atelectasis.  She is feeling some issues with bloating and having trouble taking deep breaths.  She did not have an external drain placed just a stent in her biliary system.      Review of Systems        Past Medical History:   Diagnosis Date   • Asthma    • Chronic constipation    • Chronic fatigue    • CKD (chronic kidney disease), stage III (HCC)     pcp following currently   • COVID-19 2022    test at [facility]   • Heart murmur    • Hyperlipidemia    • Hypertension    • IBS (irritable bowel syndrome)    • RA (rheumatoid arthritis) (HCC)    • Rectal bleed            Past Surgical History:   Procedure Laterality Date   • ANKLE SURGERY Left     fracture   •  SECTION      x3   • CHOLECYSTECTOMY WITH INTRAOPERATIVE CHOLANGIOGRAM N/A 2022    Procedure: CHOLECYSTECTOMY LAPAROSCOPIC INTRAOPERATIVE CHOLANGIOGRAM;  Surgeon: Penny Reese DO;  Location: [facility];  Service: General;  Laterality: N/A;  LAPAROSCOPIC CHOLECYSTECTOMY INTRAOPERATIVE CHOLANGIOGRAM   • COLONOSCOPY     • ENDOSCOPY     • FULGURATION ENDOMETRIOSIS      x3   • HERNIA REPAIR      umbilical x2   • HIP SURGERY Right     muscle/tendon reattachment   • HYSTERECTOMY     • KIDNEY STONE SURGERY     • SHOULDER SURGERY Right     fracture   • TONSILLECTOMY             Physical Exam  General:  Awake and alert with no acute distress  Eyes:  No icterus  Abdomen:  Soft, non-tender  Incision:  Clean, dry, intact      There were no vitals taken for this visit.        Diagnoses and all orders for this visit:    1. Status post laparoscopic cholecystectomy  (Primary)    2. Atelectasis    We will order her incentive spirometer for atelectasis.  She may return to clinic anytime as needed.    Thank you for allowing me to participate in the care of this interesting patient.

## 2022-09-01 ENCOUNTER — TELEPHONE (OUTPATIENT)
Dept: SURGERY | Facility: CLINIC | Age: 57
End: 2022-09-01

## 2022-09-01 NOTE — TELEPHONE ENCOUNTER
Called [facility] and spoke to respiratory. They said pt could come by any time she would like and they will give her a spirometer and show her how to use it. Pt was informed.

## 2023-07-24 ENCOUNTER — OFFICE VISIT (OUTPATIENT)
Dept: SURGERY | Facility: CLINIC | Age: 58
End: 2023-07-24
Payer: COMMERCIAL

## 2023-07-24 VITALS
RESPIRATION RATE: 20 BRPM | SYSTOLIC BLOOD PRESSURE: 120 MMHG | HEIGHT: 63 IN | DIASTOLIC BLOOD PRESSURE: 80 MMHG | WEIGHT: 211 LBS | HEART RATE: 80 BPM | BODY MASS INDEX: 37.39 KG/M2

## 2023-07-24 DIAGNOSIS — R12 HEARTBURN: ICD-10-CM

## 2023-07-24 DIAGNOSIS — Z86.010 HISTORY OF COLON POLYPS: ICD-10-CM

## 2023-07-24 DIAGNOSIS — R13.12 OROPHARYNGEAL DYSPHAGIA: ICD-10-CM

## 2023-07-24 DIAGNOSIS — R10.13 EPIGASTRIC PAIN: Primary | ICD-10-CM

## 2023-07-24 PROCEDURE — 99214 OFFICE O/P EST MOD 30 MIN: CPT | Performed by: SURGERY

## 2023-07-24 NOTE — LETTER
"July 24, 2023       No Recipients    Patient: Chely Schaeffer   YOB: 1965   Date of Visit: 7/24/2023     Dear SHANA Pat:       Thank you for referring Chely Schaeffer to me for evaluation. Below are the relevant portions of my assessment and plan of care.    If you have questions, please do not hesitate to call me. I look forward to following Chely along with you.         Sincerely,        Penny Reese DO        CC:   No Recipients    Penny Reese DO  07/24/23 1054  Sign when Signing Visit  Chely Schaeffer 58 y.o. female presents as a self ref for eval \"getting choked\" mainly with liquids, reflux, heartburn, and epi pain that feels like needle sticks.  Pt states she is also due for c-scope.   Chief Complaint   Patient presents with   • EGD   • Colonoscopy             HPI   Above noted and agree.  This very pleasant 50-year-old female is known to my service.  Back in 2022 I did a laparoscopic cholecystectomy for gallstones.  Over the past 8 months she has been having difficulty swallowing liquids.  She feels like she chokes and aspirates.  She also has a lot of heartburn.  She also gets epigastric pain.  She had a colonoscopy many years ago and did have polyps removed.      Review of Systems   All other systems reviewed and are negative.          Current Outpatient Medications:   •  albuterol sulfate  (90 Base) MCG/ACT inhaler, Inhale 2 puffs Every 4 (Four) Hours As Needed for Wheezing or Shortness of Air., Disp: , Rfl:   •  amLODIPine (NORVASC) 5 MG tablet, Take 5 mg by mouth Every Evening., Disp: , Rfl:   •  ergocalciferol (ERGOCALCIFEROL) 1.25 MG (42513 UT) capsule, Take 2 capsules by mouth. Holding due to high levels, Disp: , Rfl:   •  esomeprazole (nexIUM) 20 MG capsule, Take 20 mg by mouth Every Morning Before Breakfast., Disp: , Rfl:   •  linaclotide (LINZESS) 72 MCG capsule capsule, Take 1 capsule by mouth Daily., Disp: , Rfl:   •  " olmesartan-hydrochlorothiazide (BENICAR HCT) 40-25 MG per tablet, Take 1 tablet by mouth Daily., Disp: , Rfl:   •  rosuvastatin (CRESTOR) 5 MG tablet, Take 5 mg by mouth Every Night., Disp: , Rfl:   •  Zolpidem Tartrate (AMBIEN PO), Take 5 mg by mouth At Night As Needed (sleep)., Disp: , Rfl:         No Known Allergies        Past Medical History:   Diagnosis Date   • Asthma    • Chronic constipation    • Chronic fatigue    • CKD (chronic kidney disease), stage III     pcp following currently   • COVID-19 2022    test at [facility]   • Heart murmur    • Hyperlipidemia    • Hypertension    • IBS (irritable bowel syndrome)    • RA (rheumatoid arthritis)    • Rectal bleed            Past Surgical History:   Procedure Laterality Date   • ANKLE SURGERY Left     fracture   •  SECTION      x3   • CHOLECYSTECTOMY WITH INTRAOPERATIVE CHOLANGIOGRAM N/A 2022    Procedure: CHOLECYSTECTOMY LAPAROSCOPIC INTRAOPERATIVE CHOLANGIOGRAM;  Surgeon: Penny Reese DO;  Location: [facility];  Service: General;  Laterality: N/A;  LAPAROSCOPIC CHOLECYSTECTOMY INTRAOPERATIVE CHOLANGIOGRAM   • COLONOSCOPY     • ENDOSCOPY     • FULGURATION ENDOMETRIOSIS      x3   • HERNIA REPAIR      umbilical x2   • HIP SURGERY Right     muscle/tendon reattachment   • HYSTERECTOMY     • KIDNEY STONE SURGERY     • SHOULDER SURGERY Right     fracture   • TONSILLECTOMY             Social History     Tobacco Use   • Smoking status: Former     Packs/day: 1.00     Years: 2.00     Pack years: 2.00     Types: Cigarettes     Quit date: 2021     Years since quittin.7   • Smokeless tobacco: Never   Vaping Use   • Vaping Use: Never used   Substance Use Topics   • Alcohol use: Not Currently   • Drug use: No           Immunization History   Administered Date(s) Administered   • Flu Vaccine Quad PF 6-35MO 11/15/2017   • Influenza TIV (IM) 10/21/2021   • Pneumococcal Polysaccharide (PPSV23) 2013   • Tdap 2013  "          Physical Exam  Vitals and nursing note reviewed.   Constitutional:       Appearance: Normal appearance.   HENT:      Head: Normocephalic and atraumatic.   Cardiovascular:      Rate and Rhythm: Normal rate and regular rhythm.   Pulmonary:      Effort: Pulmonary effort is normal.      Breath sounds: Normal breath sounds.   Abdominal:      General: Bowel sounds are normal.      Palpations: Abdomen is soft.   Musculoskeletal:         General: No swelling or tenderness.   Skin:     General: Skin is warm and dry.   Neurological:      General: No focal deficit present.      Mental Status: She is alert and oriented to person, place, and time.   Psychiatric:         Mood and Affect: Mood normal.         Behavior: Behavior normal.       Debilities/Disabilities Identified: None    Emotional Behavior: Appropriate      /80   Pulse 80   Resp 20   Ht 160 cm (63\")   Wt 95.7 kg (211 lb)   BMI 37.38 kg/m²         Diagnoses and all orders for this visit:    1. Epigastric pain (Primary)    2. Oropharyngeal dysphagia    3. Heartburn    4. History of colon polyps    I discussed with the patient the benefits and risks of performing endoscopy.  Benefits and risks were not limited to but including bleeding, infection, perforation, complications of anesthesia, aspiration.  The patient appeared to understand and is willing to proceed.    Thank you for allowing me to participate in the care of this interesting patient.        "

## 2023-07-24 NOTE — PROGRESS NOTES
"Chely Schaeffer 58 y.o. female presents as a self ref for eval \"getting choked\" mainly with liquids, reflux, heartburn, and epi pain that feels like needle sticks.  Pt states she is also due for c-scope.   Chief Complaint   Patient presents with    EGD    Colonoscopy             HPI   Above noted and agree.  This very pleasant 50-year-old female is known to my service.  Back in 2022 I did a laparoscopic cholecystectomy for gallstones.  Over the past 8 months she has been having difficulty swallowing liquids.  She feels like she chokes and aspirates.  She also has a lot of heartburn.  She also gets epigastric pain.  She had a colonoscopy many years ago and did have polyps removed.      Review of Systems   All other systems reviewed and are negative.          Current Outpatient Medications:     albuterol sulfate  (90 Base) MCG/ACT inhaler, Inhale 2 puffs Every 4 (Four) Hours As Needed for Wheezing or Shortness of Air., Disp: , Rfl:     amLODIPine (NORVASC) 5 MG tablet, Take 5 mg by mouth Every Evening., Disp: , Rfl:     ergocalciferol (ERGOCALCIFEROL) 1.25 MG (15628 UT) capsule, Take 2 capsules by mouth. Holding due to high levels, Disp: , Rfl:     esomeprazole (nexIUM) 20 MG capsule, Take 20 mg by mouth Every Morning Before Breakfast., Disp: , Rfl:     linaclotide (LINZESS) 72 MCG capsule capsule, Take 1 capsule by mouth Daily., Disp: , Rfl:     olmesartan-hydrochlorothiazide (BENICAR HCT) 40-25 MG per tablet, Take 1 tablet by mouth Daily., Disp: , Rfl:     rosuvastatin (CRESTOR) 5 MG tablet, Take 5 mg by mouth Every Night., Disp: , Rfl:     Zolpidem Tartrate (AMBIEN PO), Take 5 mg by mouth At Night As Needed (sleep)., Disp: , Rfl:         No Known Allergies        Past Medical History:   Diagnosis Date    Asthma     Chronic constipation     Chronic fatigue     CKD (chronic kidney disease), stage III     pcp following currently    COVID-19 06/06/2022    test at [facility]    Heart murmur     " Hyperlipidemia     Hypertension     IBS (irritable bowel syndrome)     RA (rheumatoid arthritis)     Rectal bleed            Past Surgical History:   Procedure Laterality Date    ANKLE SURGERY Left     fracture     SECTION      x3    CHOLECYSTECTOMY WITH INTRAOPERATIVE CHOLANGIOGRAM N/A 2022    Procedure: CHOLECYSTECTOMY LAPAROSCOPIC INTRAOPERATIVE CHOLANGIOGRAM;  Surgeon: Penny Reese DO;  Location: [facility] OR;  Service: General;  Laterality: N/A;  LAPAROSCOPIC CHOLECYSTECTOMY INTRAOPERATIVE CHOLANGIOGRAM    COLONOSCOPY      ENDOSCOPY      FULGURATION ENDOMETRIOSIS      x3    HERNIA REPAIR      umbilical x2    HIP SURGERY Right     muscle/tendon reattachment    HYSTERECTOMY      KIDNEY STONE SURGERY      SHOULDER SURGERY Right     fracture    TONSILLECTOMY             Social History     Tobacco Use    Smoking status: Former     Packs/day: 1.00     Years: 2.00     Pack years: 2.00     Types: Cigarettes     Quit date: 2021     Years since quittin.7    Smokeless tobacco: Never   Vaping Use    Vaping Use: Never used   Substance Use Topics    Alcohol use: Not Currently    Drug use: No           Immunization History   Administered Date(s) Administered    Flu Vaccine Quad PF 6-35MO 11/15/2017    Influenza TIV (IM) 10/21/2021    Pneumococcal Polysaccharide (PPSV23) 2013    Tdap 2013           Physical Exam  Vitals and nursing note reviewed.   Constitutional:       Appearance: Normal appearance.   HENT:      Head: Normocephalic and atraumatic.   Cardiovascular:      Rate and Rhythm: Normal rate and regular rhythm.   Pulmonary:      Effort: Pulmonary effort is normal.      Breath sounds: Normal breath sounds.   Abdominal:      General: Bowel sounds are normal.      Palpations: Abdomen is soft.   Musculoskeletal:         General: No swelling or tenderness.   Skin:     General: Skin is warm and dry.   Neurological:      General: No focal deficit present.      Mental Status: She is alert  "and oriented to person, place, and time.   Psychiatric:         Mood and Affect: Mood normal.         Behavior: Behavior normal.       Debilities/Disabilities Identified: None    Emotional Behavior: Appropriate      /80   Pulse 80   Resp 20   Ht 160 cm (63\")   Wt 95.7 kg (211 lb)   BMI 37.38 kg/m²         Diagnoses and all orders for this visit:    1. Epigastric pain (Primary)    2. Oropharyngeal dysphagia    3. Heartburn    4. History of colon polyps    I discussed with the patient the benefits and risks of performing endoscopy.  Benefits and risks were not limited to but including bleeding, infection, perforation, complications of anesthesia, aspiration.  The patient appeared to understand and is willing to proceed.    Thank you for allowing me to participate in the care of this interesting patient.        "

## 2023-07-24 NOTE — H&P
"Chely Schaeffer 58 y.o. female presents as a self ref for eval \"getting choked\" mainly with liquids, reflux, heartburn, and epi pain that feels like needle sticks.  Pt states she is also due for c-scope.   Chief Complaint   Patient presents with    EGD    Colonoscopy             HPI   Above noted and agree.  This very pleasant 50-year-old female is known to my service.  Back in 2022 I did a laparoscopic cholecystectomy for gallstones.  Over the past 8 months she has been having difficulty swallowing liquids.  She feels like she chokes and aspirates.  She also has a lot of heartburn.  She also gets epigastric pain.  She had a colonoscopy many years ago and did have polyps removed.      Review of Systems   All other systems reviewed and are negative.          Current Outpatient Medications:     albuterol sulfate  (90 Base) MCG/ACT inhaler, Inhale 2 puffs Every 4 (Four) Hours As Needed for Wheezing or Shortness of Air., Disp: , Rfl:     amLODIPine (NORVASC) 5 MG tablet, Take 5 mg by mouth Every Evening., Disp: , Rfl:     ergocalciferol (ERGOCALCIFEROL) 1.25 MG (92173 UT) capsule, Take 2 capsules by mouth. Holding due to high levels, Disp: , Rfl:     esomeprazole (nexIUM) 20 MG capsule, Take 20 mg by mouth Every Morning Before Breakfast., Disp: , Rfl:     linaclotide (LINZESS) 72 MCG capsule capsule, Take 1 capsule by mouth Daily., Disp: , Rfl:     olmesartan-hydrochlorothiazide (BENICAR HCT) 40-25 MG per tablet, Take 1 tablet by mouth Daily., Disp: , Rfl:     rosuvastatin (CRESTOR) 5 MG tablet, Take 5 mg by mouth Every Night., Disp: , Rfl:     Zolpidem Tartrate (AMBIEN PO), Take 5 mg by mouth At Night As Needed (sleep)., Disp: , Rfl:         No Known Allergies        Past Medical History:   Diagnosis Date    Asthma     Chronic constipation     Chronic fatigue     CKD (chronic kidney disease), stage III     pcp following currently    COVID-19 06/06/2022    test at [facility]    Heart murmur     " Hyperlipidemia     Hypertension     IBS (irritable bowel syndrome)     RA (rheumatoid arthritis)     Rectal bleed            Past Surgical History:   Procedure Laterality Date    ANKLE SURGERY Left     fracture     SECTION      x3    CHOLECYSTECTOMY WITH INTRAOPERATIVE CHOLANGIOGRAM N/A 2022    Procedure: CHOLECYSTECTOMY LAPAROSCOPIC INTRAOPERATIVE CHOLANGIOGRAM;  Surgeon: Penny Reese DO;  Location: [facility] OR;  Service: General;  Laterality: N/A;  LAPAROSCOPIC CHOLECYSTECTOMY INTRAOPERATIVE CHOLANGIOGRAM    COLONOSCOPY      ENDOSCOPY      FULGURATION ENDOMETRIOSIS      x3    HERNIA REPAIR      umbilical x2    HIP SURGERY Right     muscle/tendon reattachment    HYSTERECTOMY      KIDNEY STONE SURGERY      SHOULDER SURGERY Right     fracture    TONSILLECTOMY             Social History     Tobacco Use    Smoking status: Former     Packs/day: 1.00     Years: 2.00     Pack years: 2.00     Types: Cigarettes     Quit date: 2021     Years since quittin.7    Smokeless tobacco: Never   Vaping Use    Vaping Use: Never used   Substance Use Topics    Alcohol use: Not Currently    Drug use: No           Immunization History   Administered Date(s) Administered    Flu Vaccine Quad PF 6-35MO 11/15/2017    Influenza TIV (IM) 10/21/2021    Pneumococcal Polysaccharide (PPSV23) 2013    Tdap 2013           Physical Exam  Vitals and nursing note reviewed.   Constitutional:       Appearance: Normal appearance.   HENT:      Head: Normocephalic and atraumatic.   Cardiovascular:      Rate and Rhythm: Normal rate and regular rhythm.   Pulmonary:      Effort: Pulmonary effort is normal.      Breath sounds: Normal breath sounds.   Abdominal:      General: Bowel sounds are normal.      Palpations: Abdomen is soft.   Musculoskeletal:         General: No swelling or tenderness.   Skin:     General: Skin is warm and dry.   Neurological:      General: No focal deficit present.      Mental Status: She is alert  "and oriented to person, place, and time.   Psychiatric:         Mood and Affect: Mood normal.         Behavior: Behavior normal.       Debilities/Disabilities Identified: None    Emotional Behavior: Appropriate      /80   Pulse 80   Resp 20   Ht 160 cm (63\")   Wt 95.7 kg (211 lb)   BMI 37.38 kg/m²         Diagnoses and all orders for this visit:    1. Epigastric pain (Primary)    2. Oropharyngeal dysphagia    3. Heartburn    4. History of colon polyps    I discussed with the patient the benefits and risks of performing endoscopy.  Benefits and risks were not limited to but including bleeding, infection, perforation, complications of anesthesia, aspiration.  The patient appeared to understand and is willing to proceed.    Thank you for allowing me to participate in the care of this interesting patient.          "

## 2023-07-25 PROBLEM — Z86.010 HISTORY OF COLON POLYPS: Status: ACTIVE | Noted: 2023-07-25

## 2023-07-25 PROBLEM — R12 HEARTBURN: Status: ACTIVE | Noted: 2023-07-25

## 2023-07-25 PROBLEM — R13.12 OROPHARYNGEAL DYSPHAGIA: Status: ACTIVE | Noted: 2023-07-25

## 2023-08-23 ENCOUNTER — ANESTHESIA EVENT (OUTPATIENT)
Dept: PERIOP | Facility: HOSPITAL | Age: 58
End: 2023-08-23
Payer: COMMERCIAL

## 2023-08-24 ENCOUNTER — HOSPITAL ENCOUNTER (OUTPATIENT)
Facility: HOSPITAL | Age: 58
Setting detail: HOSPITAL OUTPATIENT SURGERY
Discharge: HOME | End: 2023-08-24
Attending: SURGERY | Admitting: SURGERY
Payer: COMMERCIAL

## 2023-08-24 ENCOUNTER — ANESTHESIA (OUTPATIENT)
Dept: PERIOP | Facility: HOSPITAL | Age: 58
End: 2023-08-24
Payer: COMMERCIAL

## 2023-08-24 VITALS
DIASTOLIC BLOOD PRESSURE: 57 MMHG | OXYGEN SATURATION: 98 % | WEIGHT: 211.8 LBS | SYSTOLIC BLOOD PRESSURE: 98 MMHG | TEMPERATURE: 97.6 F | BODY MASS INDEX: 37.53 KG/M2 | RESPIRATION RATE: 22 BRPM | HEART RATE: 56 BPM | HEIGHT: 63 IN

## 2023-08-24 DIAGNOSIS — Z86.010 HISTORY OF COLON POLYPS: ICD-10-CM

## 2023-08-24 DIAGNOSIS — R13.12 OROPHARYNGEAL DYSPHAGIA: ICD-10-CM

## 2023-08-24 DIAGNOSIS — R10.13 EPIGASTRIC PAIN: ICD-10-CM

## 2023-08-24 DIAGNOSIS — R12 HEARTBURN: ICD-10-CM

## 2023-08-24 LAB — UREASE TISS QL: POSITIVE

## 2023-08-24 PROCEDURE — 88305 TISSUE EXAM BY PATHOLOGIST: CPT | Performed by: SURGERY

## 2023-08-24 PROCEDURE — 43239 EGD BIOPSY SINGLE/MULTIPLE: CPT | Performed by: SURGERY

## 2023-08-24 PROCEDURE — 45380 COLONOSCOPY AND BIOPSY: CPT | Performed by: SURGERY

## 2023-08-24 PROCEDURE — 87081 CULTURE SCREEN ONLY: CPT | Performed by: SURGERY

## 2023-08-24 PROCEDURE — 25010000002 PROPOFOL 10 MG/ML EMULSION: Performed by: NURSE ANESTHETIST, CERTIFIED REGISTERED

## 2023-08-24 RX ORDER — LIDOCAINE HYDROCHLORIDE 20 MG/ML
INJECTION, SOLUTION INFILTRATION; PERINEURAL AS NEEDED
Status: DISCONTINUED | OUTPATIENT
Start: 2023-08-24 | End: 2023-08-24 | Stop reason: SURG

## 2023-08-24 RX ORDER — PROPOFOL 10 MG/ML
VIAL (ML) INTRAVENOUS AS NEEDED
Status: DISCONTINUED | OUTPATIENT
Start: 2023-08-24 | End: 2023-08-24 | Stop reason: SURG

## 2023-08-24 RX ORDER — SODIUM CHLORIDE 9 MG/ML
40 INJECTION, SOLUTION INTRAVENOUS AS NEEDED
Status: DISCONTINUED | OUTPATIENT
Start: 2023-08-24 | End: 2023-08-24 | Stop reason: HOSPADM

## 2023-08-24 RX ORDER — SUCRALFATE 1 G/1
1 TABLET ORAL 4 TIMES DAILY
Qty: 120 TABLET | Refills: 1 | Status: SHIPPED | OUTPATIENT
Start: 2023-08-24 | End: 2024-08-23

## 2023-08-24 RX ORDER — MAGNESIUM HYDROXIDE 1200 MG/15ML
LIQUID ORAL AS NEEDED
Status: DISCONTINUED | OUTPATIENT
Start: 2023-08-24 | End: 2023-08-24 | Stop reason: HOSPADM

## 2023-08-24 RX ORDER — SODIUM CHLORIDE 0.9 % (FLUSH) 0.9 %
10 SYRINGE (ML) INJECTION AS NEEDED
Status: DISCONTINUED | OUTPATIENT
Start: 2023-08-24 | End: 2023-08-24 | Stop reason: HOSPADM

## 2023-08-24 RX ORDER — ONDANSETRON 2 MG/ML
4 INJECTION INTRAMUSCULAR; INTRAVENOUS ONCE AS NEEDED
Status: DISCONTINUED | OUTPATIENT
Start: 2023-08-24 | End: 2023-08-24 | Stop reason: HOSPADM

## 2023-08-24 RX ORDER — SODIUM CHLORIDE, SODIUM LACTATE, POTASSIUM CHLORIDE, CALCIUM CHLORIDE 600; 310; 30; 20 MG/100ML; MG/100ML; MG/100ML; MG/100ML
100 INJECTION, SOLUTION INTRAVENOUS CONTINUOUS
Status: DISCONTINUED | OUTPATIENT
Start: 2023-08-24 | End: 2023-08-24 | Stop reason: HOSPADM

## 2023-08-24 RX ORDER — SODIUM CHLORIDE, SODIUM LACTATE, POTASSIUM CHLORIDE, CALCIUM CHLORIDE 600; 310; 30; 20 MG/100ML; MG/100ML; MG/100ML; MG/100ML
9 INJECTION, SOLUTION INTRAVENOUS CONTINUOUS PRN
Status: DISCONTINUED | OUTPATIENT
Start: 2023-08-24 | End: 2023-08-24 | Stop reason: HOSPADM

## 2023-08-24 RX ORDER — SODIUM CHLORIDE 0.9 % (FLUSH) 0.9 %
10 SYRINGE (ML) INJECTION EVERY 12 HOURS SCHEDULED
Status: DISCONTINUED | OUTPATIENT
Start: 2023-08-24 | End: 2023-08-24 | Stop reason: HOSPADM

## 2023-08-24 RX ADMIN — PROPOFOL 50 MG: 10 INJECTION, EMULSION INTRAVENOUS at 07:45

## 2023-08-24 RX ADMIN — PROPOFOL 50 MG: 10 INJECTION, EMULSION INTRAVENOUS at 08:00

## 2023-08-24 RX ADMIN — PROPOFOL 50 MG: 10 INJECTION, EMULSION INTRAVENOUS at 07:37

## 2023-08-24 RX ADMIN — PROPOFOL 50 MG: 10 INJECTION, EMULSION INTRAVENOUS at 08:03

## 2023-08-24 RX ADMIN — PROPOFOL 40 MG: 10 INJECTION, EMULSION INTRAVENOUS at 08:06

## 2023-08-24 RX ADMIN — LIDOCAINE HYDROCHLORIDE 100 MG: 20 INJECTION, SOLUTION INFILTRATION; PERINEURAL at 07:37

## 2023-08-24 RX ADMIN — PROPOFOL 50 MG: 10 INJECTION, EMULSION INTRAVENOUS at 07:40

## 2023-08-24 RX ADMIN — PROPOFOL 50 MG: 10 INJECTION, EMULSION INTRAVENOUS at 07:56

## 2023-08-24 RX ADMIN — SODIUM CHLORIDE, POTASSIUM CHLORIDE, SODIUM LACTATE AND CALCIUM CHLORIDE 9 ML/HR: 600; 310; 30; 20 INJECTION, SOLUTION INTRAVENOUS at 06:37

## 2023-08-24 RX ADMIN — PROPOFOL 50 MG: 10 INJECTION, EMULSION INTRAVENOUS at 07:48

## 2023-08-24 RX ADMIN — PROPOFOL 50 MG: 10 INJECTION, EMULSION INTRAVENOUS at 07:52

## 2023-08-24 NOTE — ANESTHESIA POSTPROCEDURE EVALUATION
Patient: Chely Schaeffer    Procedure Summary       Date: 08/24/23 Room / Location: [facility] ENDOSCOPY 1 /  [facility] OR    Anesthesia Start: 0730 Anesthesia Stop: 0811    Procedures:       Esophagogastroduodenoscopy with  biopsy, polypectomy (Esophagus)      COLONOSCOPY WITH POLYPECTOMY Diagnosis:       Epigastric pain      Oropharyngeal dysphagia      Heartburn      History of colon polyps      (Epigastric pain [R10.13])      (Oropharyngeal dysphagia [R13.12])      (Heartburn [R12])      (History of colon polyps [Z86.010])    Surgeons: Penny Reese DO Provider: Kera Tejeda CRNA    Anesthesia Type: MAC ASA Status: 2            Anesthesia Type: MAC    Vitals  Vitals Value Taken Time   BP 98/57 08/24/23 0840   Temp 97.6 øF (36.4 øC) 08/24/23 0820   Pulse 55 08/24/23 0844   Resp 22 08/24/23 0820   SpO2 97 % 08/24/23 0844   Vitals shown include unvalidated device data.        Post Anesthesia Care and Evaluation    Patient location during evaluation: PHASE II  Patient participation: complete - patient participated  Level of consciousness: awake and alert  Pain management: adequate    Airway patency: patent  Anesthetic complications: No anesthetic complications  PONV Status: none  Cardiovascular status: acceptable  Respiratory status: acceptable  Hydration status: acceptable

## 2023-08-24 NOTE — OP NOTE
EGD and Colonoscopy Procedure Note      Pre-operative Diagnosis: Epigastric pain, dysphagia, heartburn, history of colon polyps    Post-operative Diagnosis: Esophagitis, gastritis, gastric polyp, polyps of the transverse colon and rectum; diverticulosis    Procedure:  EGD with biopsies using cold forceps and  Colonoscopy with polypectomy using cold forceps    Surgeon: Hugh    Anesthetic: Kera Garcia CRNA    Estimated Blood Loss: Minimal    Complications: None    Indications: See preoperative diagnosis    Findings/Treatments:   Esophagitis-multiple biopsies with cold forceps  Gastritis-biopsy for H. pylori with cold forceps  Gastric polyp-biopsied with cold forceps  Transverse colon polyp x1-removed with cold forceps  Rectal polyps x8-removed with cold forceps       Scope Withdrawal Time:  6 minutes      Recommendations: Await pathology      Procedure Details     After discussing the benefits and risks of an EGD and Colonoscopy, benefits and risks not limited to but including:  Bleeding, infection, perforation, aspiration; informed consent was signed.  The patient was taken into the endoscopy suite at [facility] and placed in the left lateral decubitus position.  MAC anesthesia was induced under appropriate monitoring.  Bite block was placed and the gastroscope was inserted thru such and advanced under direct vision to second portion of the duodenum.  A careful inspection was made as the gastroscope was withdrawn, including a retroflexed view of the proximal stomach; findings and interventions are described below.  If biopsies were taken, this was done with the cold biopsy forceps.    The bed was then rotated 180 degrees.  A rectal exam was performed.  Sphincter tone was normal.  The colonoscope was then inserted and carefully advanced to the cecum while visualizing the mucosa.  The cecum was identified by the ileocecal valve and the orifice of the appendix.  Once in the cecum  the scope was slowly withdrawn while carefully evaluating the mucosa and deflating air.  There was a transverse colon polyp removed cold forceps as well as 8 rectal polyps removed cold forceps.  Cass also had mild diverticulosis in sigmoid colon.  After the rectal polyps were removed the scope was retroflexed and straightened and withdrawn.  Cass was then taken to the recovery area in stable postoperative condition having tolerated her procedure well.    Penny Reese DO

## 2023-08-24 NOTE — ANESTHESIA PREPROCEDURE EVALUATION
Anesthesia Evaluation     Patient summary reviewed and Nursing notes reviewed   no history of anesthetic complications:   NPO Solid Status: > 8 hours  NPO Liquid Status: > 4 hours           Airway   Mallampati: I  TM distance: >3 FB  Neck ROM: full  No difficulty expected  Dental - normal exam     Pulmonary - normal exam    breath sounds clear to auscultation  (+) asthma (inhaler as needed),  Cardiovascular - normal exam  Exercise tolerance: good (4-7 METS)    ECG reviewed  Rhythm: regular  Rate: normal    (+) hypertension (took losartan last night), valvular problems/murmurs murmur, hyperlipidemia      Neuro/Psych  (+) numbness, psychiatric history AnxietyWeakness: numbness on left leg.  GI/Hepatic/Renal/Endo    (+) GERD, renal disease (stage 3 kidney disease), thyroid problem hypothyroidism    Musculoskeletal     (+) back pain  Abdominal     Abdomen: soft.   Substance History      OB/GYN          Other   arthritis (Rheumatoid),                     Anesthesia Plan    ASA 2     MAC   total IV anesthesia  intravenous induction     Anesthetic plan, risks, benefits, and alternatives have been provided, discussed and informed consent has been obtained with: patient and spouse/significant other.  Pre-procedure education provided  Use of blood products discussed with spouse/significant other and patient  Consented to blood products.    Plan discussed with resident.      CODE STATUS:

## 2023-08-24 NOTE — H&P
"Chely Schaeffer 58 y.o. female presents as a self ref for eval \"getting choked\" mainly with liquids, reflux, heartburn, and epi pain that feels like needle sticks.  Pt states she is also due for c-scope.   Chief Complaint   Patient presents with    EGD    Colonoscopy             HPI   Above noted and agree.  This very pleasant 50-year-old female is known to my service.  Back in 2022 I did a laparoscopic cholecystectomy for gallstones.  Over the past 8 months she has been having difficulty swallowing liquids.  She feels like she chokes and aspirates.  She also has a lot of heartburn.  She also gets epigastric pain.  She had a colonoscopy many years ago and did have polyps removed.      Review of Systems   All other systems reviewed and are negative.          Current Outpatient Medications:     albuterol sulfate  (90 Base) MCG/ACT inhaler, Inhale 2 puffs Every 4 (Four) Hours As Needed for Wheezing or Shortness of Air., Disp: , Rfl:     amLODIPine (NORVASC) 5 MG tablet, Take 5 mg by mouth Every Evening., Disp: , Rfl:     ergocalciferol (ERGOCALCIFEROL) 1.25 MG (73680 UT) capsule, Take 2 capsules by mouth. Holding due to high levels, Disp: , Rfl:     esomeprazole (nexIUM) 20 MG capsule, Take 20 mg by mouth Every Morning Before Breakfast., Disp: , Rfl:     linaclotide (LINZESS) 72 MCG capsule capsule, Take 1 capsule by mouth Daily., Disp: , Rfl:     olmesartan-hydrochlorothiazide (BENICAR HCT) 40-25 MG per tablet, Take 1 tablet by mouth Daily., Disp: , Rfl:     rosuvastatin (CRESTOR) 5 MG tablet, Take 5 mg by mouth Every Night., Disp: , Rfl:     Zolpidem Tartrate (AMBIEN PO), Take 5 mg by mouth At Night As Needed (sleep)., Disp: , Rfl:         No Known Allergies        Past Medical History:   Diagnosis Date    Asthma     Chronic constipation     Chronic fatigue     CKD (chronic kidney disease), stage III     pcp following currently    COVID-19 06/06/2022    test at [facility]    Heart murmur     " Hyperlipidemia     Hypertension     IBS (irritable bowel syndrome)     RA (rheumatoid arthritis)     Rectal bleed            Past Surgical History:   Procedure Laterality Date    ANKLE SURGERY Left     fracture     SECTION      x3    CHOLECYSTECTOMY WITH INTRAOPERATIVE CHOLANGIOGRAM N/A 2022    Procedure: CHOLECYSTECTOMY LAPAROSCOPIC INTRAOPERATIVE CHOLANGIOGRAM;  Surgeon: Penny Reese DO;  Location: [facility] OR;  Service: General;  Laterality: N/A;  LAPAROSCOPIC CHOLECYSTECTOMY INTRAOPERATIVE CHOLANGIOGRAM    COLONOSCOPY      ENDOSCOPY      FULGURATION ENDOMETRIOSIS      x3    HERNIA REPAIR      umbilical x2    HIP SURGERY Right     muscle/tendon reattachment    HYSTERECTOMY      KIDNEY STONE SURGERY      SHOULDER SURGERY Right     fracture    TONSILLECTOMY             Social History     Tobacco Use    Smoking status: Former     Packs/day: 1.00     Years: 2.00     Pack years: 2.00     Types: Cigarettes     Quit date: 2021     Years since quittin.7    Smokeless tobacco: Never   Vaping Use    Vaping Use: Never used   Substance Use Topics    Alcohol use: Not Currently    Drug use: No           Immunization History   Administered Date(s) Administered    Flu Vaccine Quad PF 6-35MO 11/15/2017    Influenza TIV (IM) 10/21/2021    Pneumococcal Polysaccharide (PPSV23) 2013    Tdap 2013           Physical Exam  Vitals and nursing note reviewed.   Constitutional:       Appearance: Normal appearance.   HENT:      Head: Normocephalic and atraumatic.   Cardiovascular:      Rate and Rhythm: Normal rate and regular rhythm.   Pulmonary:      Effort: Pulmonary effort is normal.      Breath sounds: Normal breath sounds.   Abdominal:      General: Bowel sounds are normal.      Palpations: Abdomen is soft.   Musculoskeletal:         General: No swelling or tenderness.   Skin:     General: Skin is warm and dry.   Neurological:      General: No focal deficit present.      Mental Status: She is alert  "and oriented to person, place, and time.   Psychiatric:         Mood and Affect: Mood normal.         Behavior: Behavior normal.       Debilities/Disabilities Identified: None    Emotional Behavior: Appropriate      /80   Pulse 80   Resp 20   Ht 160 cm (63\")   Wt 95.7 kg (211 lb)   BMI 37.38 kg/mý         Diagnoses and all orders for this visit:    1. Epigastric pain (Primary)    2. Oropharyngeal dysphagia    3. Heartburn    4. History of colon polyps    I discussed with the patient the benefits and risks of performing endoscopy.  Benefits and risks were not limited to but including bleeding, infection, perforation, complications of anesthesia, aspiration.  The patient appeared to understand and is willing to proceed.    Thank you for allowing me to participate in the care of this interesting patient.          "

## 2023-08-25 LAB
LAB AP CASE REPORT: NORMAL
PATH REPORT.FINAL DX SPEC: NORMAL
PATH REPORT.GROSS SPEC: NORMAL

## 2023-09-06 ENCOUNTER — TELEPHONE (OUTPATIENT)
Dept: SURGERY | Facility: CLINIC | Age: 58
End: 2023-09-06
Payer: COMMERCIAL

## 2023-09-06 NOTE — TELEPHONE ENCOUNTER
Pt called and states her mouth is very sore and she has developed a yeast inf following [facility] Pylori Rx.  Dr. Reese Rx the followin) Nystatin Susp - 5 ml swish and spit 4 times daily X 7 days  2) Diflucan 150 mg tabs - #3 tabs. - 1 tan today.  1 tab Friday. 1 tab     Rx called to [facility].  Pt informed.

## 2023-11-20 RX ORDER — SUCRALFATE 1 G/1
1 TABLET ORAL 4 TIMES DAILY
Qty: 120 TABLET | Refills: 1 | Status: SHIPPED | OUTPATIENT
Start: 2023-11-20

## 2024-01-08 ENCOUNTER — OFFICE VISIT (OUTPATIENT)
Dept: SURGERY | Facility: CLINIC | Age: 59
End: 2024-01-08
Payer: COMMERCIAL

## 2024-01-08 VITALS
SYSTOLIC BLOOD PRESSURE: 118 MMHG | BODY MASS INDEX: 39.51 KG/M2 | WEIGHT: 223 LBS | HEART RATE: 72 BPM | RESPIRATION RATE: 16 BRPM | HEIGHT: 63 IN | DIASTOLIC BLOOD PRESSURE: 76 MMHG

## 2024-01-08 DIAGNOSIS — R11.0 NAUSEA: ICD-10-CM

## 2024-01-08 DIAGNOSIS — R10.12 LUQ PAIN: Primary | ICD-10-CM

## 2024-01-08 DIAGNOSIS — R10.11 RUQ PAIN: ICD-10-CM

## 2024-01-08 DIAGNOSIS — R14.0 BLOATING: ICD-10-CM

## 2024-01-08 PROCEDURE — 99213 OFFICE O/P EST LOW 20 MIN: CPT | Performed by: SURGERY

## 2024-01-08 RX ORDER — HYDROCODONE BITARTRATE AND ACETAMINOPHEN 10; 325 MG/1; MG/1
1 TABLET ORAL EVERY 6 HOURS PRN
COMMUNITY

## 2024-01-08 NOTE — LETTER
"January 8, 2024       No Recipients    Patient: Chely Schaeffer   YOB: 1965   Date of Visit: 1/8/2024     Dear SHANA Pat:       Thank you for referring Chely Schaeffer to me for evaluation. Below are the relevant portions of my assessment and plan of care.    If you have questions, please do not hesitate to call me. I look forward to following Chely along with you.         Sincerely,        Penny Reese DO        CC:   No Recipients    Penny Reese DO  01/08/24 0914  Sign when Signing Visit  Chely Schaeffer 58 y.o. female presents as a self ref for eval epi pain that comes and goes.  Pt describes pain as feeling like something is ripping and the pain comes and goes.  + Bloating \"really bad.\"  + loss of appetite, + nausea.    Chief Complaint   Patient presents with   • Abdominal Pain             HPI   Above noted and agree.  Cass is here with complaints of right upper quadrant pain and left upper quadrant pain.  She feels like the pain goes right along her diaphragm.  The pain is transient and stabbing.  It even hurts her to touch her skin.  All of this started about 2 years ago even before her gallbladder was removed.  She also has had an EGD which was positive for H. pylori.  She does move her bowels daily.  She does not smoke or use tobacco products.  She recently had surgery on her lower back which has helped resolve her neurologic symptoms in her left leg.      Review of Systems          Current Outpatient Medications:   •  albuterol sulfate  (90 Base) MCG/ACT inhaler, Inhale 2 puffs Every 4 (Four) Hours As Needed for Wheezing or Shortness of Air., Disp: , Rfl:   •  amLODIPine (NORVASC) 5 MG tablet, Take 1 tablet by mouth Every Evening., Disp: , Rfl:   •  ergocalciferol (ERGOCALCIFEROL) 1.25 MG (15198 UT) capsule, Take 2 capsules by mouth. Holding due to high levels, Disp: , Rfl:   •  esomeprazole (nexIUM) 20 MG capsule, Take 1 capsule by mouth " Every Morning Before Breakfast., Disp: , Rfl:   •  HYDROcodone-acetaminophen (NORCO)  MG per tablet, Take 1 tablet by mouth Every 6 (Six) Hours As Needed for Moderate Pain., Disp: , Rfl:   •  linaclotide (LINZESS) 72 MCG capsule capsule, Take 1 capsule by mouth Daily., Disp: , Rfl:   •  olmesartan-hydrochlorothiazide (BENICAR HCT) 40-25 MG per tablet, Take 1 tablet by mouth Daily., Disp: , Rfl:   •  rosuvastatin (CRESTOR) 5 MG tablet, Take 1 tablet by mouth Every Night., Disp: , Rfl:   •  sucralfate (CARAFATE) 1 g tablet, TAKE 1 TABLET BY MOUTH 4 TIMES A DAY, Disp: 120 tablet, Rfl: 1  •  Zolpidem Tartrate (AMBIEN PO), Take 5 mg by mouth At Night As Needed (sleep)., Disp: , Rfl:         No Known Allergies        Past Medical History:   Diagnosis Date   • Asthma    • Chronic constipation    • Chronic fatigue    • CKD (chronic kidney disease), stage III     pcp following currently   • COVID-19 2022    test at [facility]   • Heart murmur    • Hyperlipidemia    • Hypertension    • IBS (irritable bowel syndrome)    • RA (rheumatoid arthritis)    • Rectal bleed            Past Surgical History:   Procedure Laterality Date   • ANKLE SURGERY Left     fracture   •  SECTION      x3   • CHOLECYSTECTOMY WITH INTRAOPERATIVE CHOLANGIOGRAM N/A 2022    Procedure: CHOLECYSTECTOMY LAPAROSCOPIC INTRAOPERATIVE CHOLANGIOGRAM;  Surgeon: Penny Reese DO;  Location: [facility] OR;  Service: General;  Laterality: N/A;  LAPAROSCOPIC CHOLECYSTECTOMY INTRAOPERATIVE CHOLANGIOGRAM   • COLONOSCOPY     • COLONOSCOPY W/ POLYPECTOMY N/A 2023    Procedure: COLONOSCOPY WITH POLYPECTOMY;  Surgeon: Penny Reese DO;  Location: [facility] OR;  Service: Gastroenterology;  Laterality: N/A;  Diverticulosis  Transverse polyp  Rectal polyp x8   • ENDOSCOPY     • ENDOSCOPY N/A 2023    Procedure: Esophagogastroduodenoscopy with  biopsy, polypectomy;  Surgeon: Penny Reese DO;  Location: [facility] OR;   "Service: Gastroenterology;  Laterality: N/A;  Esophagitis  Gastritis  Gastric polyp   • FULGURATION ENDOMETRIOSIS      x3   • HERNIA REPAIR      umbilical x2   • HIP SURGERY Right     muscle/tendon reattachment   • HYSTERECTOMY     • KIDNEY STONE SURGERY     • SHOULDER SURGERY Right     fracture   • TONSILLECTOMY             Social History     Tobacco Use   • Smoking status: Former     Packs/day: 1.00     Years: 2.00     Additional pack years: 0.00     Total pack years: 2.00     Types: Cigarettes     Quit date: 2021     Years since quittin.1   • Smokeless tobacco: Never   Vaping Use   • Vaping Use: Never used   Substance Use Topics   • Alcohol use: Not Currently   • Drug use: No           Immunization History   Administered Date(s) Administered   • Flu Vaccine Quad PF 6-35MO 11/15/2017   • Influenza TIV (IM) 10/21/2021   • Pneumococcal Polysaccharide (PPSV23) 2013   • Tdap 2013           Physical Exam  Vitals and nursing note reviewed.   Constitutional:       Appearance: Normal appearance.   HENT:      Head: Normocephalic and atraumatic.   Cardiovascular:      Rate and Rhythm: Normal rate and regular rhythm.   Pulmonary:      Effort: Pulmonary effort is normal.      Breath sounds: Normal breath sounds.   Abdominal:      General: Bowel sounds are normal.      Palpations: Abdomen is soft.      Comments: Upper abdominal tenderness to palpation   Musculoskeletal:         General: No swelling or tenderness.   Skin:     General: Skin is warm and dry.   Neurological:      General: No focal deficit present.      Mental Status: She is alert and oriented to person, place, and time.   Psychiatric:         Mood and Affect: Mood normal.         Behavior: Behavior normal.         Debilities/Disabilities Identified: None    Emotional Behavior: Appropriate      /76   Pulse 72   Resp 16   Ht 160 cm (63\")   Wt 101 kg (223 lb)   BMI 39.50 kg/m²         Diagnoses and all orders for this visit:    1. LUQ " pain (Primary)    2. RUQ pain    3. Bloating    4. Nausea    We will get a CT scan of the abdomen pelvis with contrast, gastric emptying study, urease breath test and celiac panel.    Thank you for allowing me to participate in the care of this interesting patient.

## 2024-01-08 NOTE — PROGRESS NOTES
"Chely Schaeffer 58 y.o. female presents as a self ref for eval epi pain that comes and goes.  Pt describes pain as feeling like something is ripping and the pain comes and goes.  + Bloating \"really bad.\"  + loss of appetite, + nausea.    Chief Complaint   Patient presents with    Abdominal Pain             HPI   Above noted and agree.  Cass is here with complaints of right upper quadrant pain and left upper quadrant pain.  She feels like the pain goes right along her diaphragm.  The pain is transient and stabbing.  It even hurts her to touch her skin.  All of this started about 2 years ago even before her gallbladder was removed.  She also has had an EGD which was positive for H. pylori.  She does move her bowels daily.  She does not smoke or use tobacco products.  She recently had surgery on her lower back which has helped resolve her neurologic symptoms in her left leg.      Review of Systems          Current Outpatient Medications:     albuterol sulfate  (90 Base) MCG/ACT inhaler, Inhale 2 puffs Every 4 (Four) Hours As Needed for Wheezing or Shortness of Air., Disp: , Rfl:     amLODIPine (NORVASC) 5 MG tablet, Take 1 tablet by mouth Every Evening., Disp: , Rfl:     ergocalciferol (ERGOCALCIFEROL) 1.25 MG (29287 UT) capsule, Take 2 capsules by mouth. Holding due to high levels, Disp: , Rfl:     esomeprazole (nexIUM) 20 MG capsule, Take 1 capsule by mouth Every Morning Before Breakfast., Disp: , Rfl:     HYDROcodone-acetaminophen (NORCO)  MG per tablet, Take 1 tablet by mouth Every 6 (Six) Hours As Needed for Moderate Pain., Disp: , Rfl:     linaclotide (LINZESS) 72 MCG capsule capsule, Take 1 capsule by mouth Daily., Disp: , Rfl:     olmesartan-hydrochlorothiazide (BENICAR HCT) 40-25 MG per tablet, Take 1 tablet by mouth Daily., Disp: , Rfl:     rosuvastatin (CRESTOR) 5 MG tablet, Take 1 tablet by mouth Every Night., Disp: , Rfl:     sucralfate (CARAFATE) 1 g tablet, TAKE 1 TABLET BY MOUTH 4 " TIMES A DAY, Disp: 120 tablet, Rfl: 1    Zolpidem Tartrate (AMBIEN PO), Take 5 mg by mouth At Night As Needed (sleep)., Disp: , Rfl:         No Known Allergies        Past Medical History:   Diagnosis Date    Asthma     Chronic constipation     Chronic fatigue     CKD (chronic kidney disease), stage III     pcp following currently    COVID-19 2022    test at [facility]    Heart murmur     Hyperlipidemia     Hypertension     IBS (irritable bowel syndrome)     RA (rheumatoid arthritis)     Rectal bleed            Past Surgical History:   Procedure Laterality Date    ANKLE SURGERY Left     fracture     SECTION      x3    CHOLECYSTECTOMY WITH INTRAOPERATIVE CHOLANGIOGRAM N/A 2022    Procedure: CHOLECYSTECTOMY LAPAROSCOPIC INTRAOPERATIVE CHOLANGIOGRAM;  Surgeon: Penny Reese DO;  Location:  [facility] OR;  Service: General;  Laterality: N/A;  LAPAROSCOPIC CHOLECYSTECTOMY INTRAOPERATIVE CHOLANGIOGRAM    COLONOSCOPY      COLONOSCOPY W/ POLYPECTOMY N/A 2023    Procedure: COLONOSCOPY WITH POLYPECTOMY;  Surgeon: Penny Reese DO;  Location:  [facility] OR;  Service: Gastroenterology;  Laterality: N/A;  Diverticulosis  Transverse polyp  Rectal polyp x8    ENDOSCOPY      ENDOSCOPY N/A 2023    Procedure: Esophagogastroduodenoscopy with  biopsy, polypectomy;  Surgeon: Penny Reese DO;  Location:  [facility] OR;  Service: Gastroenterology;  Laterality: N/A;  Esophagitis  Gastritis  Gastric polyp    FULGURATION ENDOMETRIOSIS      x3    HERNIA REPAIR      umbilical x2    HIP SURGERY Right     muscle/tendon reattachment    HYSTERECTOMY      KIDNEY STONE SURGERY      SHOULDER SURGERY Right     fracture    TONSILLECTOMY             Social History     Tobacco Use    Smoking status: Former     Packs/day: 1.00     Years: 2.00     Additional pack years: 0.00     Total pack years: 2.00     Types: Cigarettes     Quit date: 2021     Years since quittin.1    Smokeless tobacco: Never  "  Vaping Use    Vaping Use: Never used   Substance Use Topics    Alcohol use: Not Currently    Drug use: No           Immunization History   Administered Date(s) Administered    Flu Vaccine Quad PF 6-35MO 11/15/2017    Influenza TIV (IM) 10/21/2021    Pneumococcal Polysaccharide (PPSV23) 05/08/2013    Tdap 05/08/2013           Physical Exam  Vitals and nursing note reviewed.   Constitutional:       Appearance: Normal appearance.   HENT:      Head: Normocephalic and atraumatic.   Cardiovascular:      Rate and Rhythm: Normal rate and regular rhythm.   Pulmonary:      Effort: Pulmonary effort is normal.      Breath sounds: Normal breath sounds.   Abdominal:      General: Bowel sounds are normal.      Palpations: Abdomen is soft.      Comments: Upper abdominal tenderness to palpation   Musculoskeletal:         General: No swelling or tenderness.   Skin:     General: Skin is warm and dry.   Neurological:      General: No focal deficit present.      Mental Status: She is alert and oriented to person, place, and time.   Psychiatric:         Mood and Affect: Mood normal.         Behavior: Behavior normal.         Debilities/Disabilities Identified: None    Emotional Behavior: Appropriate      /76   Pulse 72   Resp 16   Ht 160 cm (63\")   Wt 101 kg (223 lb)   BMI 39.50 kg/m²         Diagnoses and all orders for this visit:    1. LUQ pain (Primary)    2. RUQ pain    3. Bloating    4. Nausea    We will get a CT scan of the abdomen pelvis with contrast, gastric emptying study, urease breath test and celiac panel.    Thank you for allowing me to participate in the care of this interesting patient.         "

## 2024-01-09 DIAGNOSIS — R10.13 EPIGASTRIC PAIN: Primary | ICD-10-CM

## 2024-01-09 DIAGNOSIS — R14.0 BLOATING: ICD-10-CM

## 2024-01-09 DIAGNOSIS — R11.0 NAUSEA: ICD-10-CM

## 2024-01-16 ENCOUNTER — LAB (OUTPATIENT)
Dept: LAB | Facility: HOSPITAL | Age: 59
End: 2024-01-16
Payer: COMMERCIAL

## 2024-01-16 ENCOUNTER — APPOINTMENT (OUTPATIENT)
Dept: OTHER | Facility: HOSPITAL | Age: 59
End: 2024-01-16
Payer: COMMERCIAL

## 2024-01-16 ENCOUNTER — HOSPITAL ENCOUNTER (OUTPATIENT)
Dept: CT IMAGING | Facility: HOSPITAL | Age: 59
Discharge: HOME | End: 2024-01-16
Payer: COMMERCIAL

## 2024-01-16 DIAGNOSIS — R10.13 EPIGASTRIC PAIN: ICD-10-CM

## 2024-01-16 DIAGNOSIS — R14.0 BLOATING: ICD-10-CM

## 2024-01-16 DIAGNOSIS — R11.0 NAUSEA: ICD-10-CM

## 2024-01-16 PROCEDURE — 82784 ASSAY IGA/IGD/IGG/IGM EACH: CPT

## 2024-01-16 PROCEDURE — 25510000001 IOPAMIDOL PER 1 ML: Performed by: SURGERY

## 2024-01-16 PROCEDURE — 86258 DGP ANTIBODY EACH IG CLASS: CPT

## 2024-01-16 PROCEDURE — 36415 COLL VENOUS BLD VENIPUNCTURE: CPT

## 2024-01-16 PROCEDURE — 86231 EMA EACH IG CLASS: CPT

## 2024-01-16 PROCEDURE — 74177 CT ABD & PELVIS W/CONTRAST: CPT

## 2024-01-16 PROCEDURE — 83013 H PYLORI (C-13) BREATH: CPT

## 2024-01-16 PROCEDURE — 86364 TISS TRNSGLTMNASE EA IG CLAS: CPT

## 2024-01-16 RX ADMIN — IOPAMIDOL 100 ML: 755 INJECTION, SOLUTION INTRAVENOUS at 12:16

## 2024-01-17 LAB
ENDOMYSIUM IGA SER QL: NEGATIVE
GLIADIN PEPTIDE IGA SER-ACNC: 9 UNITS (ref 0–19)
GLIADIN PEPTIDE IGG SER-ACNC: 6 UNITS (ref 0–19)
IGA SERPL-MCNC: 177 MG/DL (ref 87–352)
TTG IGA SER-ACNC: <2 U/ML (ref 0–3)
TTG IGG SER-ACNC: 7 U/ML (ref 0–5)
UREA BREATH TEST QL: NEGATIVE

## 2024-01-17 NOTE — PROGRESS NOTES
One of Cass's celiac tests was weakly positive.  I see she has RA in her history.  She will need to hold all gluten and discuss with her rheumatologist if she has one.  If she does not have one then we should probably refer her

## 2024-01-30 ENCOUNTER — HOSPITAL ENCOUNTER (OUTPATIENT)
Dept: NUCLEAR MEDICINE | Facility: HOSPITAL | Age: 59
Discharge: HOME | End: 2024-01-30
Payer: COMMERCIAL

## 2024-01-30 DIAGNOSIS — R11.0 NAUSEA: ICD-10-CM

## 2024-01-30 DIAGNOSIS — R10.13 EPIGASTRIC PAIN: ICD-10-CM

## 2024-01-30 DIAGNOSIS — R14.0 BLOATING: ICD-10-CM

## 2024-01-30 PROCEDURE — 78264 GASTRIC EMPTYING IMG STUDY: CPT

## 2024-01-30 PROCEDURE — A9541 TC99M SULFUR COLLOID: HCPCS | Performed by: SURGERY

## 2024-01-30 PROCEDURE — 0 TECHNETIUM SULFUR COLLOID: Performed by: SURGERY

## 2024-01-30 RX ADMIN — TECHNETIUM TC 99M SULFUR COLLOID 1 DOSE: KIT at 07:42

## 2024-03-14 RX ORDER — SUCRALFATE 1 G/1
1 TABLET ORAL 4 TIMES DAILY
Qty: 120 TABLET | Refills: 1 | Status: SHIPPED | OUTPATIENT
Start: 2024-03-14

## 2024-03-25 ENCOUNTER — TRANSCRIBE ORDERS (OUTPATIENT)
Dept: MAMMOGRAPHY | Facility: HOSPITAL | Age: 59
End: 2024-03-25
Payer: COMMERCIAL

## 2024-03-25 DIAGNOSIS — Z12.31 SCREENING MAMMOGRAM FOR BREAST CANCER: Primary | ICD-10-CM

## (undated) DEVICE — TROCARS: Brand: KII® BALLOON BLUNT TIP SYSTEM

## (undated) DEVICE — SYR LL 3CC

## (undated) DEVICE — DRSNG SURESITE WNDW 2.38X2.75

## (undated) DEVICE — BW-412T DISP COMBO CLEANING BRUSH: Brand: SINGLE USE COMBINATION CLEANING BRUSH

## (undated) DEVICE — UNDYED BRAIDED (POLYGLACTIN 910), SYNTHETIC ABSORBABLE SUTURE: Brand: COATED VICRYL

## (undated) DEVICE — SOL IRR H2O BTL 1000ML STRL

## (undated) DEVICE — ENDOPATH XCEL UNIVERSAL TROCAR STABLILITY SLEEVES: Brand: ENDOPATH XCEL

## (undated) DEVICE — DECANT BG O JET

## (undated) DEVICE — DRSNG TELFA PAD NONADH STR 1S 3X8IN

## (undated) DEVICE — SPNG GZ 2S 2X2 8PLY STRL PK/2

## (undated) DEVICE — 3M™ STERI-STRIP™ REINFORCED ADHESIVE SKIN CLOSURES, R1547, 1/2 IN X 4 IN (12 MM X 100 MM), 6 STRIPS/ENVELOPE: Brand: 3M™ STERI-STRIP™

## (undated) DEVICE — SYR LUERLOK 20CC BX/50

## (undated) DEVICE — ENDOPATH XCEL BLADELESS TROCARS WITH STABILITY SLEEVES: Brand: ENDOPATH XCEL

## (undated) DEVICE — FRCP BX RADJAW4 NDL 2.8 240CM LG OG BX40

## (undated) DEVICE — TBG INSUFL W FLTR STRL

## (undated) DEVICE — ADAPT CLN BIOGUARD AIR/H2O DISP

## (undated) DEVICE — Device

## (undated) DEVICE — LAPAROSCOPIC SCOPE WARMER: Brand: DEROYAL

## (undated) DEVICE — THE BITE BLOCK MAXI, LATEX FREE STRAP IS USED TO PROTECT THE ENDOSCOPE INSERTION TUBE FROM BEING BITTEN BY THE PATIENT.

## (undated) DEVICE — PATIENT RETURN ELECTRODE, SINGLE-USE, CONTACT QUALITY MONITORING, ADULT, WITH 9FT CORD, FOR PATIENTS WEIGING OVER 33LBS. (15KG): Brand: MEGADYNE

## (undated) DEVICE — KT ORCA ORCAPOD DISP STRL

## (undated) DEVICE — ENDOPOUCH RETRIEVER SPECIMEN RETRIEVAL BAGS: Brand: ENDOPOUCH RETRIEVER

## (undated) DEVICE — LINER SURG CANSTR SXN S/RIGD 1500CC

## (undated) DEVICE — DECANTER: Brand: UNBRANDED

## (undated) DEVICE — SET CATH CHOLANG REDK W/SCOOP TIP INTRO 4F 50CM

## (undated) DEVICE — SAFELINER SUCTION CANISTER 1000CC: Brand: DEROYAL

## (undated) DEVICE — SYR LUERLOK 30CC

## (undated) DEVICE — METZENBAUM SCISSOR TIP, DISPOSABLE: Brand: RENEW

## (undated) DEVICE — GLV SURG SENSICARE W/ALOE PF LF 6.5 STRL

## (undated) DEVICE — CONTAINER,SPECIMEN,OR STERILE,4OZ: Brand: MEDLINE

## (undated) DEVICE — LAPAROSCOPIC SMOKE FILTRATION SYSTEM: Brand: PALL LAPAROSHIELD® PLUS LAPAROSCOPIC SMOKE FILTRATION SYSTEM

## (undated) DEVICE — DRP C/ARM 41X74IN

## (undated) DEVICE — DRSNG SURESITE WNDW 4X4.5

## (undated) DEVICE — APPL CHLORAPREP HI/LITE 26ML ORNG

## (undated) DEVICE — LAB CORP AGAR SLANT UREA PK/10

## (undated) DEVICE — VIAL FORMALIN CAP 10P 40ML

## (undated) DEVICE — SUT VIC 0/0 UR6 27IN DYED J603H

## (undated) DEVICE — 2, DISPOSABLE SUCTION/IRRIGATOR WITH DISPOSABLE TIP: Brand: STRYKEFLOW

## (undated) DEVICE — PK LAP GEN 90